# Patient Record
Sex: FEMALE | Race: WHITE | NOT HISPANIC OR LATINO | URBAN - METROPOLITAN AREA
[De-identification: names, ages, dates, MRNs, and addresses within clinical notes are randomized per-mention and may not be internally consistent; named-entity substitution may affect disease eponyms.]

---

## 2021-03-04 ENCOUNTER — IMMUNIZATION (OUTPATIENT)
Dept: VACCINE CLINIC | Facility: HOSPITAL | Age: 66
End: 2021-03-04

## 2021-03-04 PROCEDURE — 0001A: CPT | Performed by: INTERNAL MEDICINE

## 2021-03-04 PROCEDURE — 91300 HC SARSCOV02 VAC 30MCG/0.3ML IM: CPT | Performed by: INTERNAL MEDICINE

## 2021-03-25 ENCOUNTER — IMMUNIZATION (OUTPATIENT)
Dept: VACCINE CLINIC | Facility: HOSPITAL | Age: 66
End: 2021-03-25

## 2021-03-25 PROCEDURE — 0002A: CPT | Performed by: INTERNAL MEDICINE

## 2021-03-25 PROCEDURE — 91300 HC SARSCOV02 VAC 30MCG/0.3ML IM: CPT | Performed by: INTERNAL MEDICINE

## 2024-11-27 ENCOUNTER — HOSPITAL ENCOUNTER (INPATIENT)
Facility: HOSPITAL | Age: 69
LOS: 4 days | Discharge: HOME | End: 2024-12-03
Attending: INTERNAL MEDICINE | Admitting: INTERNAL MEDICINE
Payer: MEDICARE

## 2024-11-27 DIAGNOSIS — N88.8 MASS OF UTERINE CERVIX DETERMINED BY ULTRASOUND: Primary | ICD-10-CM

## 2024-11-27 DIAGNOSIS — N13.30 HYDRONEPHROSIS: ICD-10-CM

## 2024-11-27 PROBLEM — R06.00 DYSPNEA: Status: ACTIVE | Noted: 2024-11-27

## 2024-11-27 LAB
GEN 5 1HR TROPONIN T REFLEX: 6 NG/L
TROPONIN T DELTA: NORMAL
TROPONIN T SERPL HS-MCNC: <6 NG/L

## 2024-11-27 PROCEDURE — 25010000002 ONDANSETRON PER 1 MG: Performed by: INTERNAL MEDICINE

## 2024-11-27 PROCEDURE — G0378 HOSPITAL OBSERVATION PER HR: HCPCS

## 2024-11-27 PROCEDURE — 84484 ASSAY OF TROPONIN QUANT: CPT | Performed by: INTERNAL MEDICINE

## 2024-11-27 RX ORDER — BISACODYL 10 MG
10 SUPPOSITORY, RECTAL RECTAL DAILY PRN
Status: DISCONTINUED | OUTPATIENT
Start: 2024-11-27 | End: 2024-12-02

## 2024-11-27 RX ORDER — HYDROXYZINE PAMOATE 25 MG/1
25 CAPSULE ORAL 3 TIMES DAILY PRN
Status: DISCONTINUED | OUTPATIENT
Start: 2024-11-27 | End: 2024-12-03 | Stop reason: HOSPADM

## 2024-11-27 RX ORDER — TRAZODONE HYDROCHLORIDE 50 MG/1
50 TABLET, FILM COATED ORAL NIGHTLY
Status: DISCONTINUED | OUTPATIENT
Start: 2024-11-27 | End: 2024-12-03 | Stop reason: HOSPADM

## 2024-11-27 RX ORDER — TRAZODONE HYDROCHLORIDE 50 MG/1
50 TABLET, FILM COATED ORAL NIGHTLY
COMMUNITY

## 2024-11-27 RX ORDER — CARBAMAZEPINE 200 MG/1
400 TABLET ORAL 3 TIMES DAILY
Status: DISCONTINUED | OUTPATIENT
Start: 2024-11-27 | End: 2024-12-03 | Stop reason: HOSPADM

## 2024-11-27 RX ORDER — HYDROXYZINE PAMOATE 25 MG/1
25 CAPSULE ORAL 3 TIMES DAILY PRN
COMMUNITY

## 2024-11-27 RX ORDER — IBUPROFEN 600 MG/1
600 TABLET, FILM COATED ORAL EVERY 6 HOURS PRN
COMMUNITY
End: 2024-12-03 | Stop reason: HOSPADM

## 2024-11-27 RX ORDER — POLYETHYLENE GLYCOL 3350 17 G/17G
17 POWDER, FOR SOLUTION ORAL DAILY PRN
Status: DISCONTINUED | OUTPATIENT
Start: 2024-11-27 | End: 2024-12-02

## 2024-11-27 RX ORDER — ATORVASTATIN CALCIUM 80 MG/1
80 TABLET, FILM COATED ORAL NIGHTLY
COMMUNITY

## 2024-11-27 RX ORDER — PREGABALIN 50 MG/1
50 CAPSULE ORAL 2 TIMES DAILY
Status: DISCONTINUED | OUTPATIENT
Start: 2024-11-27 | End: 2024-12-03 | Stop reason: HOSPADM

## 2024-11-27 RX ORDER — ERGOCALCIFEROL 1.25 MG/1
50000 CAPSULE, LIQUID FILLED ORAL WEEKLY
COMMUNITY

## 2024-11-27 RX ORDER — ATORVASTATIN CALCIUM 80 MG/1
80 TABLET, FILM COATED ORAL NIGHTLY
Status: DISCONTINUED | OUTPATIENT
Start: 2024-11-27 | End: 2024-12-03 | Stop reason: HOSPADM

## 2024-11-27 RX ORDER — EZETIMIBE 10 MG/1
10 TABLET ORAL DAILY
COMMUNITY

## 2024-11-27 RX ORDER — ONDANSETRON 2 MG/ML
4 INJECTION INTRAMUSCULAR; INTRAVENOUS EVERY 6 HOURS PRN
Status: DISCONTINUED | OUTPATIENT
Start: 2024-11-27 | End: 2024-12-03 | Stop reason: HOSPADM

## 2024-11-27 RX ORDER — ACETAMINOPHEN 160 MG/5ML
650 SOLUTION ORAL EVERY 4 HOURS PRN
Status: DISCONTINUED | OUTPATIENT
Start: 2024-11-27 | End: 2024-12-03 | Stop reason: HOSPADM

## 2024-11-27 RX ORDER — BISACODYL 5 MG/1
5 TABLET, DELAYED RELEASE ORAL DAILY PRN
Status: DISCONTINUED | OUTPATIENT
Start: 2024-11-27 | End: 2024-12-02

## 2024-11-27 RX ORDER — ACETAMINOPHEN 500 MG
500 TABLET ORAL EVERY 4 HOURS PRN
COMMUNITY

## 2024-11-27 RX ORDER — CYCLOBENZAPRINE HCL 10 MG
10 TABLET ORAL 2 TIMES DAILY PRN
Status: DISCONTINUED | OUTPATIENT
Start: 2024-11-27 | End: 2024-12-03 | Stop reason: HOSPADM

## 2024-11-27 RX ORDER — AMOXICILLIN 250 MG
2 CAPSULE ORAL 2 TIMES DAILY PRN
Status: DISCONTINUED | OUTPATIENT
Start: 2024-11-27 | End: 2024-12-02

## 2024-11-27 RX ORDER — ACETAMINOPHEN 325 MG/1
650 TABLET ORAL EVERY 4 HOURS PRN
Status: DISCONTINUED | OUTPATIENT
Start: 2024-11-27 | End: 2024-12-03 | Stop reason: HOSPADM

## 2024-11-27 RX ORDER — LEVOTHYROXINE SODIUM 50 UG/1
50 TABLET ORAL DAILY
Status: DISCONTINUED | OUTPATIENT
Start: 2024-11-28 | End: 2024-12-03 | Stop reason: HOSPADM

## 2024-11-27 RX ORDER — ACETAMINOPHEN 650 MG/1
650 SUPPOSITORY RECTAL EVERY 4 HOURS PRN
Status: DISCONTINUED | OUTPATIENT
Start: 2024-11-27 | End: 2024-12-03 | Stop reason: HOSPADM

## 2024-11-27 RX ORDER — ONDANSETRON 4 MG/1
4 TABLET, ORALLY DISINTEGRATING ORAL EVERY 6 HOURS PRN
Status: DISCONTINUED | OUTPATIENT
Start: 2024-11-27 | End: 2024-12-03 | Stop reason: HOSPADM

## 2024-11-27 RX ORDER — CYCLOBENZAPRINE HCL 10 MG
10 TABLET ORAL 2 TIMES DAILY PRN
COMMUNITY

## 2024-11-27 RX ORDER — PREGABALIN 50 MG/1
50 CAPSULE ORAL 2 TIMES DAILY
COMMUNITY

## 2024-11-27 RX ORDER — CARBAMAZEPINE 200 MG/1
200 TABLET ORAL 3 TIMES DAILY
COMMUNITY
End: 2024-12-03 | Stop reason: HOSPADM

## 2024-11-27 RX ORDER — ONDANSETRON 4 MG/1
4 TABLET, FILM COATED ORAL EVERY 8 HOURS PRN
COMMUNITY

## 2024-11-27 RX ORDER — MELOXICAM 15 MG/1
15 TABLET ORAL DAILY
COMMUNITY
End: 2024-12-03 | Stop reason: HOSPADM

## 2024-11-27 RX ORDER — LEVOTHYROXINE SODIUM 50 UG/1
50 TABLET ORAL DAILY
COMMUNITY

## 2024-11-27 RX ORDER — CARBAMAZEPINE 200 MG/1
200 TABLET ORAL 3 TIMES DAILY
Status: DISCONTINUED | OUTPATIENT
Start: 2024-11-27 | End: 2024-11-27

## 2024-11-27 RX ADMIN — ONDANSETRON 4 MG: 2 INJECTION, SOLUTION INTRAMUSCULAR; INTRAVENOUS at 21:24

## 2024-11-28 ENCOUNTER — APPOINTMENT (OUTPATIENT)
Dept: CT IMAGING | Facility: HOSPITAL | Age: 69
End: 2024-11-28
Payer: MEDICARE

## 2024-11-28 ENCOUNTER — APPOINTMENT (OUTPATIENT)
Dept: CARDIOLOGY | Facility: HOSPITAL | Age: 69
End: 2024-11-28
Payer: MEDICARE

## 2024-11-28 ENCOUNTER — APPOINTMENT (OUTPATIENT)
Dept: GENERAL RADIOLOGY | Facility: HOSPITAL | Age: 69
End: 2024-11-28
Payer: MEDICARE

## 2024-11-28 PROBLEM — E78.5 HLD (HYPERLIPIDEMIA): Status: ACTIVE | Noted: 2024-11-28

## 2024-11-28 PROBLEM — G89.4 CHRONIC PAIN SYNDROME: Status: ACTIVE | Noted: 2024-11-28

## 2024-11-28 PROBLEM — M54.9 CHRONIC BACK PAIN: Status: ACTIVE | Noted: 2024-11-28

## 2024-11-28 PROBLEM — E87.1 HYPONATREMIA: Status: ACTIVE | Noted: 2024-11-28

## 2024-11-28 PROBLEM — N93.9 ABNORMAL VAGINAL BLEEDING: Status: ACTIVE | Noted: 2024-11-28

## 2024-11-28 PROBLEM — E55.9 VITAMIN D DEFICIENCY: Status: ACTIVE | Noted: 2024-11-28

## 2024-11-28 PROBLEM — E03.9 HYPOTHYROIDISM (ACQUIRED): Status: ACTIVE | Noted: 2024-11-28

## 2024-11-28 PROBLEM — E87.6 HYPOKALEMIA: Status: ACTIVE | Noted: 2024-11-28

## 2024-11-28 PROBLEM — N88.8: Status: ACTIVE | Noted: 2024-11-28

## 2024-11-28 PROBLEM — D64.9 ANEMIA: Status: ACTIVE | Noted: 2024-11-28

## 2024-11-28 PROBLEM — F41.8 ANXIETY ASSOCIATED WITH DEPRESSION: Status: ACTIVE | Noted: 2024-11-28

## 2024-11-28 PROBLEM — G89.29 CHRONIC BACK PAIN: Status: ACTIVE | Noted: 2024-11-28

## 2024-11-28 PROBLEM — G50.0 TRIGEMINAL NEURALGIA: Status: ACTIVE | Noted: 2024-11-28

## 2024-11-28 LAB
ANION GAP SERPL CALCULATED.3IONS-SCNC: 10.5 MMOL/L (ref 5–15)
AORTIC DIMENSIONLESS INDEX: 0.84 (DI)
ASCENDING AORTA: 2.9 CM
BH CV ECHO MEAS - AO MAX PG: 7.4 MMHG
BH CV ECHO MEAS - AO MEAN PG: 4.2 MMHG
BH CV ECHO MEAS - AO ROOT DIAM: 3.1 CM
BH CV ECHO MEAS - AO V2 MAX: 135.7 CM/SEC
BH CV ECHO MEAS - AO V2 VTI: 29.3 CM
BH CV ECHO MEAS - AVA(I,D): 2.13 CM2
BH CV ECHO MEAS - EDV(CUBED): 31.5 ML
BH CV ECHO MEAS - EDV(MOD-SP2): 126 ML
BH CV ECHO MEAS - EDV(MOD-SP4): 115 ML
BH CV ECHO MEAS - EF(MOD-BP): 68.5 %
BH CV ECHO MEAS - EF(MOD-SP2): 70.6 %
BH CV ECHO MEAS - EF(MOD-SP4): 69.6 %
BH CV ECHO MEAS - ESV(CUBED): 14.2 ML
BH CV ECHO MEAS - ESV(MOD-SP2): 37 ML
BH CV ECHO MEAS - ESV(MOD-SP4): 35 ML
BH CV ECHO MEAS - FS: 23.3 %
BH CV ECHO MEAS - IVS/LVPW: 1.19 CM
BH CV ECHO MEAS - IVSD: 1.25 CM
BH CV ECHO MEAS - LAT PEAK E' VEL: 9 CM/SEC
BH CV ECHO MEAS - LV MASS(C)D: 110 GRAMS
BH CV ECHO MEAS - LV MAX PG: 5.6 MMHG
BH CV ECHO MEAS - LV MEAN PG: 2.7 MMHG
BH CV ECHO MEAS - LV V1 MAX: 118.4 CM/SEC
BH CV ECHO MEAS - LV V1 VTI: 24.7 CM
BH CV ECHO MEAS - LVIDD: 3.2 CM
BH CV ECHO MEAS - LVIDS: 2.42 CM
BH CV ECHO MEAS - LVOT AREA: 2.5 CM2
BH CV ECHO MEAS - LVOT DIAM: 1.79 CM
BH CV ECHO MEAS - LVPWD: 1.05 CM
BH CV ECHO MEAS - MED PEAK E' VEL: 9.2 CM/SEC
BH CV ECHO MEAS - MV A DUR: 0.15 SEC
BH CV ECHO MEAS - MV A MAX VEL: 74.6 CM/SEC
BH CV ECHO MEAS - MV DEC SLOPE: 275.4 CM/SEC2
BH CV ECHO MEAS - MV DEC TIME: 0.12 SEC
BH CV ECHO MEAS - MV E MAX VEL: 68.4 CM/SEC
BH CV ECHO MEAS - MV E/A: 0.92
BH CV ECHO MEAS - MV MAX PG: 3.2 MMHG
BH CV ECHO MEAS - MV MEAN PG: 1.55 MMHG
BH CV ECHO MEAS - MV P1/2T: 95.3 MSEC
BH CV ECHO MEAS - MV V2 VTI: 25.8 CM
BH CV ECHO MEAS - MVA(P1/2T): 2.31 CM2
BH CV ECHO MEAS - MVA(VTI): 2.42 CM2
BH CV ECHO MEAS - PA ACC TIME: 0.11 SEC
BH CV ECHO MEAS - PA V2 MAX: 85.5 CM/SEC
BH CV ECHO MEAS - PULM A REVS DUR: 0.16 SEC
BH CV ECHO MEAS - PULM A REVS VEL: 24 CM/SEC
BH CV ECHO MEAS - PULM DIAS VEL: 27.4 CM/SEC
BH CV ECHO MEAS - PULM S/D: 0.7
BH CV ECHO MEAS - PULM SYS VEL: 19.3 CM/SEC
BH CV ECHO MEAS - RV MAX PG: 1.63 MMHG
BH CV ECHO MEAS - RV V1 MAX: 63.8 CM/SEC
BH CV ECHO MEAS - RV V1 VTI: 12 CM
BH CV ECHO MEAS - SV(LVOT): 62.4 ML
BH CV ECHO MEAS - SV(MOD-SP2): 89 ML
BH CV ECHO MEAS - SV(MOD-SP4): 80 ML
BH CV ECHO MEASUREMENTS AVERAGE E/E' RATIO: 7.52
BH CV XLRA - RV BASE: 3 CM
BH CV XLRA - TDI S': 11.1 CM/SEC
BUN SERPL-MCNC: 14 MG/DL (ref 8–23)
BUN/CREAT SERPL: 12.2 (ref 7–25)
CALCIUM SPEC-SCNC: 8.5 MG/DL (ref 8.6–10.5)
CHLORIDE SERPL-SCNC: 91 MMOL/L (ref 98–107)
CO2 SERPL-SCNC: 25.5 MMOL/L (ref 22–29)
CREAT SERPL-MCNC: 1.15 MG/DL (ref 0.57–1)
DAT POLY-SP REAG RBC QL: NEGATIVE
DEPRECATED RDW RBC AUTO: 39.2 FL (ref 37–54)
EGFRCR SERPLBLD CKD-EPI 2021: 51.4 ML/MIN/1.73
ERYTHROCYTE [DISTWIDTH] IN BLOOD BY AUTOMATED COUNT: 12.2 % (ref 12.3–15.4)
FERRITIN SERPL-MCNC: 679 NG/ML (ref 13–150)
FOLATE SERPL-MCNC: 14.7 NG/ML (ref 4.78–24.2)
GLUCOSE SERPL-MCNC: 118 MG/DL (ref 65–99)
HAPTOGLOB SERPL-MCNC: 420 MG/DL (ref 30–200)
HCT VFR BLD AUTO: 27.4 % (ref 34–46.6)
HGB BLD-MCNC: 9.6 G/DL (ref 12–15.9)
HGB RETIC QN AUTO: 30.8 PG (ref 29.8–36.1)
IMM RETICS NFR: 10.3 % (ref 3–15.8)
IRON 24H UR-MRATE: 13 MCG/DL (ref 37–145)
IRON SATN MFR SERPL: 6 % (ref 20–50)
LDH SERPL-CCNC: 204 U/L (ref 135–214)
LEFT ATRIUM VOLUME INDEX: 26.3 ML/M2
MAGNESIUM SERPL-MCNC: 2.2 MG/DL (ref 1.6–2.4)
MCH RBC QN AUTO: 30.9 PG (ref 26.6–33)
MCHC RBC AUTO-ENTMCNC: 35 G/DL (ref 31.5–35.7)
MCV RBC AUTO: 88.1 FL (ref 79–97)
OSMOLALITY SERPL: 273 MOSM/KG (ref 280–301)
OSMOLALITY UR: 474 MOSM/KG
PLATELET # BLD AUTO: 274 10*3/MM3 (ref 140–450)
PMV BLD AUTO: 9.4 FL (ref 6–12)
POTASSIUM SERPL-SCNC: 3.2 MMOL/L (ref 3.5–5.2)
POTASSIUM SERPL-SCNC: 3.5 MMOL/L (ref 3.5–5.2)
QT INTERVAL: 401 MS
QTC INTERVAL: 475 MS
RBC # BLD AUTO: 3.11 10*6/MM3 (ref 3.77–5.28)
RETICS # AUTO: 0.04 10*6/MM3 (ref 0.02–0.13)
RETICS # AUTO: 0.04 10*6/MM3 (ref 0.02–0.13)
RETICS/RBC NFR AUTO: 1.43 % (ref 0.7–1.9)
RETICS/RBC NFR AUTO: 1.43 % (ref 0.7–1.9)
SINUS: 3.3 CM
SODIUM SERPL-SCNC: 127 MMOL/L (ref 136–145)
SODIUM UR-SCNC: <20 MMOL/L
STJ: 2.49 CM
TIBC SERPL-MCNC: 203 MCG/DL (ref 298–536)
TRANSFERRIN SERPL-MCNC: 136 MG/DL (ref 200–360)
TSH SERPL DL<=0.05 MIU/L-ACNC: 0.51 UIU/ML (ref 0.27–4.2)
VIT B12 BLD-MCNC: 505 PG/ML (ref 211–946)
WBC NRBC COR # BLD AUTO: 11.81 10*3/MM3 (ref 3.4–10.8)

## 2024-11-28 PROCEDURE — 83930 ASSAY OF BLOOD OSMOLALITY: CPT | Performed by: HOSPITALIST

## 2024-11-28 PROCEDURE — 93306 TTE W/DOPPLER COMPLETE: CPT | Performed by: INTERNAL MEDICINE

## 2024-11-28 PROCEDURE — G0378 HOSPITAL OBSERVATION PER HR: HCPCS

## 2024-11-28 PROCEDURE — 93005 ELECTROCARDIOGRAM TRACING: CPT | Performed by: STUDENT IN AN ORGANIZED HEALTH CARE EDUCATION/TRAINING PROGRAM

## 2024-11-28 PROCEDURE — 85046 RETICYTE/HGB CONCENTRATE: CPT | Performed by: STUDENT IN AN ORGANIZED HEALTH CARE EDUCATION/TRAINING PROGRAM

## 2024-11-28 PROCEDURE — 80048 BASIC METABOLIC PNL TOTAL CA: CPT | Performed by: INTERNAL MEDICINE

## 2024-11-28 PROCEDURE — 25510000001 IOPAMIDOL PER 1 ML: Performed by: HOSPITALIST

## 2024-11-28 PROCEDURE — 25810000003 SODIUM CHLORIDE 0.9 % SOLUTION: Performed by: HOSPITALIST

## 2024-11-28 PROCEDURE — 25010000002 POTASSIUM CHLORIDE 10 MEQ/100ML SOLUTION: Performed by: HOSPITALIST

## 2024-11-28 PROCEDURE — 85045 AUTOMATED RETICULOCYTE COUNT: CPT | Performed by: STUDENT IN AN ORGANIZED HEALTH CARE EDUCATION/TRAINING PROGRAM

## 2024-11-28 PROCEDURE — 82607 VITAMIN B-12: CPT | Performed by: STUDENT IN AN ORGANIZED HEALTH CARE EDUCATION/TRAINING PROGRAM

## 2024-11-28 PROCEDURE — 25010000002 ONDANSETRON PER 1 MG: Performed by: INTERNAL MEDICINE

## 2024-11-28 PROCEDURE — 71045 X-RAY EXAM CHEST 1 VIEW: CPT

## 2024-11-28 PROCEDURE — 25510000001 PERFLUTREN 6.52 MG/ML SUSPENSION 2 ML VIAL: Performed by: INTERNAL MEDICINE

## 2024-11-28 PROCEDURE — 99222 1ST HOSP IP/OBS MODERATE 55: CPT | Performed by: STUDENT IN AN ORGANIZED HEALTH CARE EDUCATION/TRAINING PROGRAM

## 2024-11-28 PROCEDURE — 99203 OFFICE O/P NEW LOW 30 MIN: CPT | Performed by: STUDENT IN AN ORGANIZED HEALTH CARE EDUCATION/TRAINING PROGRAM

## 2024-11-28 PROCEDURE — 83615 LACTATE (LD) (LDH) ENZYME: CPT | Performed by: STUDENT IN AN ORGANIZED HEALTH CARE EDUCATION/TRAINING PROGRAM

## 2024-11-28 PROCEDURE — 93010 ELECTROCARDIOGRAM REPORT: CPT | Performed by: INTERNAL MEDICINE

## 2024-11-28 PROCEDURE — 84300 ASSAY OF URINE SODIUM: CPT | Performed by: HOSPITALIST

## 2024-11-28 PROCEDURE — 25010000002 PROCHLORPERAZINE 10 MG/2ML SOLUTION: Performed by: HOSPITALIST

## 2024-11-28 PROCEDURE — 71275 CT ANGIOGRAPHY CHEST: CPT

## 2024-11-28 PROCEDURE — 83540 ASSAY OF IRON: CPT | Performed by: STUDENT IN AN ORGANIZED HEALTH CARE EDUCATION/TRAINING PROGRAM

## 2024-11-28 PROCEDURE — 83735 ASSAY OF MAGNESIUM: CPT | Performed by: HOSPITALIST

## 2024-11-28 PROCEDURE — 74177 CT ABD & PELVIS W/CONTRAST: CPT

## 2024-11-28 PROCEDURE — 82728 ASSAY OF FERRITIN: CPT | Performed by: STUDENT IN AN ORGANIZED HEALTH CARE EDUCATION/TRAINING PROGRAM

## 2024-11-28 PROCEDURE — 83935 ASSAY OF URINE OSMOLALITY: CPT | Performed by: HOSPITALIST

## 2024-11-28 PROCEDURE — 85027 COMPLETE CBC AUTOMATED: CPT | Performed by: INTERNAL MEDICINE

## 2024-11-28 PROCEDURE — 84443 ASSAY THYROID STIM HORMONE: CPT | Performed by: HOSPITALIST

## 2024-11-28 PROCEDURE — 86880 COOMBS TEST DIRECT: CPT | Performed by: STUDENT IN AN ORGANIZED HEALTH CARE EDUCATION/TRAINING PROGRAM

## 2024-11-28 PROCEDURE — 84466 ASSAY OF TRANSFERRIN: CPT | Performed by: STUDENT IN AN ORGANIZED HEALTH CARE EDUCATION/TRAINING PROGRAM

## 2024-11-28 PROCEDURE — 93306 TTE W/DOPPLER COMPLETE: CPT

## 2024-11-28 PROCEDURE — 84132 ASSAY OF SERUM POTASSIUM: CPT | Performed by: HOSPITALIST

## 2024-11-28 PROCEDURE — 83010 ASSAY OF HAPTOGLOBIN QUANT: CPT | Performed by: STUDENT IN AN ORGANIZED HEALTH CARE EDUCATION/TRAINING PROGRAM

## 2024-11-28 PROCEDURE — 82746 ASSAY OF FOLIC ACID SERUM: CPT | Performed by: STUDENT IN AN ORGANIZED HEALTH CARE EDUCATION/TRAINING PROGRAM

## 2024-11-28 RX ORDER — IOPAMIDOL 755 MG/ML
100 INJECTION, SOLUTION INTRAVASCULAR
Status: COMPLETED | OUTPATIENT
Start: 2024-11-28 | End: 2024-11-28

## 2024-11-28 RX ORDER — SODIUM CHLORIDE 9 MG/ML
100 INJECTION, SOLUTION INTRAVENOUS CONTINUOUS
Status: ACTIVE | OUTPATIENT
Start: 2024-11-28 | End: 2024-11-28

## 2024-11-28 RX ORDER — POTASSIUM CHLORIDE 750 MG/1
40 TABLET, FILM COATED, EXTENDED RELEASE ORAL EVERY 4 HOURS
Status: DISCONTINUED | OUTPATIENT
Start: 2024-11-28 | End: 2024-11-28

## 2024-11-28 RX ORDER — PROCHLORPERAZINE EDISYLATE 5 MG/ML
10 INJECTION INTRAMUSCULAR; INTRAVENOUS EVERY 6 HOURS PRN
Status: DISCONTINUED | OUTPATIENT
Start: 2024-11-28 | End: 2024-12-03 | Stop reason: HOSPADM

## 2024-11-28 RX ORDER — POTASSIUM CHLORIDE 750 MG/1
40 TABLET, FILM COATED, EXTENDED RELEASE ORAL EVERY 4 HOURS
Status: COMPLETED | OUTPATIENT
Start: 2024-11-28 | End: 2024-11-29

## 2024-11-28 RX ORDER — POTASSIUM CHLORIDE 7.45 MG/ML
10 INJECTION INTRAVENOUS
Status: COMPLETED | OUTPATIENT
Start: 2024-11-28 | End: 2024-11-28

## 2024-11-28 RX ADMIN — CARBAMAZEPINE 400 MG: 200 TABLET ORAL at 12:25

## 2024-11-28 RX ADMIN — POTASSIUM CHLORIDE 10 MEQ: 7.46 INJECTION, SOLUTION INTRAVENOUS at 15:32

## 2024-11-28 RX ADMIN — LEVOTHYROXINE SODIUM 50 MCG: 0.05 TABLET ORAL at 12:25

## 2024-11-28 RX ADMIN — POTASSIUM CHLORIDE 40 MEQ: 750 TABLET, EXTENDED RELEASE ORAL at 21:05

## 2024-11-28 RX ADMIN — IOPAMIDOL 100 ML: 755 INJECTION, SOLUTION INTRAVENOUS at 14:03

## 2024-11-28 RX ADMIN — ACETAMINOPHEN 650 MG: 325 TABLET ORAL at 11:17

## 2024-11-28 RX ADMIN — PREGABALIN 50 MG: 50 CAPSULE ORAL at 12:25

## 2024-11-28 RX ADMIN — POTASSIUM CHLORIDE 10 MEQ: 7.46 INJECTION, SOLUTION INTRAVENOUS at 11:43

## 2024-11-28 RX ADMIN — CARBAMAZEPINE 400 MG: 200 TABLET ORAL at 21:17

## 2024-11-28 RX ADMIN — POTASSIUM CHLORIDE 10 MEQ: 7.46 INJECTION, SOLUTION INTRAVENOUS at 14:39

## 2024-11-28 RX ADMIN — PERFLUTREN 2 ML: 6.52 INJECTION, SUSPENSION INTRAVENOUS at 13:55

## 2024-11-28 RX ADMIN — PREGABALIN 50 MG: 50 CAPSULE ORAL at 21:05

## 2024-11-28 RX ADMIN — SODIUM CHLORIDE 100 ML/HR: 9 INJECTION, SOLUTION INTRAVENOUS at 08:16

## 2024-11-28 RX ADMIN — TRAZODONE HYDROCHLORIDE 50 MG: 50 TABLET ORAL at 21:05

## 2024-11-28 RX ADMIN — ONDANSETRON 4 MG: 2 INJECTION, SOLUTION INTRAMUSCULAR; INTRAVENOUS at 08:16

## 2024-11-28 RX ADMIN — ACETAMINOPHEN 650 MG: 325 TABLET ORAL at 16:43

## 2024-11-28 RX ADMIN — POTASSIUM CHLORIDE 10 MEQ: 7.46 INJECTION, SOLUTION INTRAVENOUS at 10:06

## 2024-11-28 RX ADMIN — ATORVASTATIN CALCIUM 80 MG: 80 TABLET, FILM COATED ORAL at 21:05

## 2024-11-28 RX ADMIN — PROCHLORPERAZINE EDISYLATE 10 MG: 5 INJECTION INTRAMUSCULAR; INTRAVENOUS at 11:43

## 2024-11-28 NOTE — NURSING NOTE
Patient refused medications r/t to [facility]. Medication was open. Unable to return to [facility]. Spoke with Sonia Jaramillo about how to properly dispose of lyrica. Per her recommendations lyrica was disposed in the biohazard black box and was witnessed by Courtney Goldsmith RN

## 2024-11-28 NOTE — H&P
HISTORY AND PHYSICAL   [facility]        Date of Admission: 2024  Patient Identification:  Name: Ambreen Espinoza  Age: 69 y.o.  Sex: female  :  1955  MRN: [id number]                     Primary Care Physician: Lisseth Rankin APRN    Chief Complaint:  69 year old female presented to the ED at [facility] with shortness of breath; this started about a month ago and is worse with exertion and relieved with rest; she denies chest pain; no fever or chills; she has no known history of lung or heart disease; she used to smoke but stopped some years ago; no sick contacts; she has also had vaginal bleeding and had an endometrial biopsy earlier today    History of Present Illness:   As above    Past Medical History:  Hyperlipidemia  Hypothyroidism  Trigeminal neuralgia    Past Surgical History:  History reviewed. No pertinent surgical history.   Home Meds:  Medications Prior to Admission   Medication Sig Dispense Refill Last Dose/Taking    acetaminophen (TYLENOL) 500 MG tablet Take 1 tablet by mouth Every 4 (Four) Hours As Needed for Mild Pain.   2024    atorvastatin (LIPITOR) 80 MG tablet Take 1 tablet by mouth Every Night.   2024    carBAMazepine (TEGretol) 200 MG tablet Take 1 tablet by mouth 3 (Three) Times a Day. Pt takes 2 tablets (400mg) TID   2024    ezetimibe (ZETIA) 10 MG tablet Take 1 tablet by mouth Daily. Pt takes 2 tablets (20mg) once daily   2024    hydrOXYzine pamoate (VISTARIL) 25 MG capsule Take 1 capsule by mouth 3 (Three) Times a Day As Needed for Itching. 1-2 capsule TID PRN   Taking As Needed    ibuprofen (ADVIL,MOTRIN) 600 MG tablet Take 1 tablet by mouth Every 6 (Six) Hours As Needed for Mild Pain.   2024    levothyroxine (SYNTHROID, LEVOTHROID) 50 MCG tablet Take 1 tablet by mouth Daily.   2024    meloxicam (MOBIC) 15 MG tablet Take 1 tablet by mouth Daily.   2024    ondansetron (ZOFRAN) 4 MG tablet Take 1 tablet by mouth  Every 8 (Eight) Hours As Needed for Nausea or Vomiting.   11/27/2024    pregabalin (LYRICA) 50 MG capsule Take 1 capsule by mouth 2 (Two) Times a Day.   11/26/2024    traZODone (DESYREL) 50 MG tablet Take 1 tablet by mouth Every Night.   11/26/2024    vitamin D (ERGOCALCIFEROL) 1.25 MG (16058 UT) capsule capsule Take 1 capsule by mouth 1 (One) Time Per Week. Sunday   Past Week    cyclobenzaprine (FLEXERIL) 10 MG tablet Take 1 tablet by mouth 2 (Two) Times a Day As Needed for Muscle Spasms.          Allergies:  No Known Allergies  Immunizations:  Immunization History   Administered Date(s) Administered    COVID-19 (PFIZER) 12YRS+ (COMIRNATY) 11/16/2023    COVID-19 (PFIZER) BIVALENT 12+YRS 11/28/2022    COVID-19 (PFIZER) Purple Cap Monovalent 03/04/2021, 03/25/2021, 09/29/2021     Social History:   Social History     Social History Narrative    Not on file     Social History     Socioeconomic History    Marital status:    Tobacco Use    Smoking status: Never    Smokeless tobacco: Never   Vaping Use    Vaping status: Never Used   Substance and Sexual Activity    Alcohol use: Never    Drug use: Never    Sexual activity: Defer       Family History:  History reviewed. No pertinent family history.     Review of Systems  See history of present illness and past medical history.  Patient denies headache, dizziness, syncope, falls, trauma, change in vision, change in hearing, change in taste, changes in weight, changes in appetite, focal weakness, numbness, or paresthesia.  Patient denies chest pain, palpitations  PND, cough, sinus pressure, rhinorrhea, epistaxis, hemoptysis, nausea, vomiting,hematemesis, diarrhea, constipation or hematochezia.  Denies cold or heat intolerance, polydipsia, polyuria, polyphagia. Denies hematuria, pyuria, dysuria, hesitancy, frequency or urgency. Denies consumption of raw and under cooked meats foods or change in water source.  Denies fever, chills, sweats, night sweats.  Denies missing  "any routine medications. Remainder of ROS is negative.    Objective:  T Max 24 hrs: Temp (24hrs), Av.8 °F (36.6 °C), Min:97.8 °F (36.6 °C), Max:97.8 °F (36.6 °C)    Vitals Ranges:   Temp:  [97.8 °F (36.6 °C)] 97.8 °F (36.6 °C)  Heart Rate:  [96] 96  Resp:  [15] 15  BP: (108)/(70) 108/70      Exam:  There were no vitals taken for this visit.    General Appearance:    Alert, cooperative, no distress, appears stated age   Head:    Normocephalic, without obvious abnormality, atraumatic   Eyes:    PERRL, conjunctivae/corneas clear, EOM's intact, both eyes   Ears:    Normal external ear canals, both ears   Nose:   Nares normal, septum midline, mucosa normal, no drainage    or sinus tenderness   Throat:   Lips, mucosa, and tongue normal   Neck:   Supple, symmetrical, trachea midline, no adenopathy;     thyroid:  no enlargement/tenderness/nodules; no carotid    bruit or JVD   Back:     Symmetric, no curvature, ROM normal, no CVA tenderness   Lungs:     Decreased breath sounds bilaterally, respirations unlabored   Chest Wall:    No tenderness or deformity    Heart:    Regular rate and rhythm, S1 and S2 normal, no murmur, rub   or gallop   Abdomen:     Soft, nontender, bowel sounds active all four quadrants,     no masses, no hepatomegaly, no splenomegaly   Extremities:   Extremities normal, atraumatic, no cyanosis or edema      .    Data Review:  Labs in chart were reviewed.  No results found for: \"WBC\", \"HGB\", \"HCT\", \"PLT\"  No results found for: \"NA\", \"K\", \"CL\", \"CO2\", \"BUN\", \"CREATININE\", \"GLUCOSE\"  No results found for: \"CALCIUM\", \"MG\", \"PHOS\"  No results found for: \"AST\", \"ALT\", \"ALKPHOS\"             Imaging Results (All)       None              Assessment:  Active Hospital Problems    Diagnosis  POA    **Dyspnea [R06.00]  Yes      Resolved Hospital Problems   No resolved problems to display.   Hyperlipidemia  Hypertension  Hypothyroidism  Hypokalemia  Vaginal bleeding  Hyperglycemia  Ckd3  anemia    Plan:  Will ask " cardiology to see her  Cta was supposedly done at [facility] but had not resulted  Echo, troponin  Monitor on telemetry  Hold off on fluids  Replace potassium  Dw patient and ed provider    Марина Paredes MD  11/27/2024  20:26 EST

## 2024-11-28 NOTE — PLAN OF CARE
Goal Outcome Evaluation:            IVF. Potassium replaced. ECHO and CT chest & abd/pelvis completed. NPO at midnight for potential surgery in the morning per Urology

## 2024-11-28 NOTE — PLAN OF CARE
Goal Outcome Evaluation:  Plan of Care Reviewed With: patient           Outcome Evaluation: A&Ox4. NSR. RA. 1 episode of emesis, zofran given x1. Pt up to bathroom with x1 assist.

## 2024-11-28 NOTE — CONSULTS
"[facility] INITIAL INPATIENT CONSULTATION NOTE    REASON FOR CONSULTATION:    Pelvic mass, symptomatic anemia    HISTORY OF PRESENT ILLNESS:  Ambreen Espinoza is a 69 y.o. female who we are asked to see today in consultation for anemia.  She presented to outside hospital ER yesterday with shortness of breath.    .  Patient reports she has been having nausea vomiting fatigue shortness of breath for the last month.  She also reports she has been having abnormal vaginal bleeding for a month and a half.she has lost about 10 pounds in the last 1 month.  She also reports taste change-salty food tastes \"disgusting \"to her now earlier yesterday she was seen at gynecologist office at [facility] for evaluation of postmenopausal bleeding ongoing for 2 weeks.  Pelvic ultrasound revealed a area of increased vascularity in her cervical region measuring about 5.3 cm.  Endometrial biopsy was obtained.  Results currently pending.  She is reporting that she has had increased bleeding since her biopsy yesterday.  Labs today notable for hemoglobin 9.6.  Patient denies any personal history of cancer      History reviewed. No pertinent past medical history.    History reviewed. No pertinent surgical history.    SOCIAL HISTORY:   reports that she has never smoked. She has never used smokeless tobacco. She reports that she does not drink alcohol and does not use drugs.    FAMILY HISTORY:  family history is not on file.    ALLERGIES:  No Known Allergies    MEDICATIONS:  As listed in the electronic medical record.    Review of Systems   Constitutional:  Positive for appetite change, fatigue and unexpected weight change.   Respiratory:  Positive for shortness of breath.    Gastrointestinal:  Negative for abdominal pain, anal bleeding and blood in stool.   Genitourinary:  Positive for vaginal bleeding. Negative for hematuria.   Hematological:  Negative for adenopathy. Does not bruise/bleed easily.       Vitals:    11/27/24 1930 " "11/27/24 2356 11/28/24 0357 11/28/24 0755   BP: 105/67 95/57 94/74 93/71   BP Location:  Right arm Right arm Right arm   Patient Position: Sitting Lying Lying Sitting   Pulse: 102 90 98 94   Resp: 18 16 16 16   Temp: 99.9 °F (37.7 °C) 99.9 °F (37.7 °C) 98.1 °F (36.7 °C) 99.3 °F (37.4 °C)   TempSrc: Oral Oral Oral Oral   SpO2:  98% 97% 100%   Weight: 71.6 kg (157 lb 14.4 oz)      Height: 165.1 cm (65\")          Physical Exam  Constitutional:       General: She is not in acute distress.     Appearance: Normal appearance.   HENT:      Head: Normocephalic and atraumatic.   Cardiovascular:      Rate and Rhythm: Normal rate and regular rhythm.      Heart sounds: No murmur heard.  Pulmonary:      Effort: No respiratory distress.      Breath sounds: Normal breath sounds.   Abdominal:      General: Abdomen is flat.      Palpations: Abdomen is soft. There is no mass.   Musculoskeletal:         General: No swelling.   Skin:     General: Skin is warm and dry.   Neurological:      General: No focal deficit present.      Mental Status: She is oriented to person, place, and time.         DIAGNOSTIC DATA:    Results from last 7 days   Lab Units 11/28/24  0354   WBC 10*3/mm3 11.81*   HEMOGLOBIN g/dL 9.6*   HEMATOCRIT % 27.4*   PLATELETS 10*3/mm3 274      Results from last 7 days   Lab Units 11/28/24  0354   SODIUM mmol/L 127*   POTASSIUM mmol/L 3.2*   CHLORIDE mmol/L 91*   CO2 mmol/L 25.5   BUN mg/dL 14   CREATININE mg/dL 1.15*   CALCIUM mg/dL 8.5*   GLUCOSE mg/dL 118*          IMAGING:    US Pelvic, Non OB, Transvaginal Only (11/27/2024 13:20)     Assessment & Plan   ASSESSMENT:  This is a 69 y.o. female with:    *Cervical mass status post endometrial biopsy on 11/27/2024 at [place], results pending  *Postmenopausal bleeding, ongoing for a month and a half  *Nausea, vomiting, ongoing for last 1 month  *Loss of appetite for the last 1 month  *Unintentional weight loss, 10 pounds in last 1 month  11/27/2024 pelvic ultrasound-abnormal " tissue at cervical area with increased vascularity-5.3 x 3.4 x 3.4 cm.  She is status post endometrial biopsy with Tiff Fontaine at [facility] yesterday, 11/27/2024.  Results currently pending   We will obtain CT chest abdomen pelvis for evaluation.  She will ultimately need referral to gynecology oncology at [facility] for further evaluation and management of the likely cervical malignancy.  I discussed that her nausea vomiting loss of appetite unintentional weight loss are likely secondary to underlying malignancy      *Normocytic anemia, likely from vaginal bleeding  11/28/2024 hemoglobin 9.6, MCV 88.1  Will do lab eval for anemia      RECOMMENDATIONS/PLAN:   Obtain ferritin, iron panel, reticulocyte hemoglobin, B12, folate, PHILIP, LDH, haptoglobin, reticulocyte count  Obtain CT chest abdomen pelvis  She will need to follow-up with [facility] gynecology oncology for evaluation and management of the pelvic mass/cervical mass  CBC with differential daily while hospitalized.  We will follow along    Daphne Yi MD

## 2024-11-28 NOTE — CONSULTS
Patient Name: Ambreen Espinoza  :1955  69 y.o.    Date of Admission: 2024  Date of Consultation:  24  Encounter Provider: Shari Pedersen RN  Place of Service: [facility]  Referring Provider: No Known Provider  Patient Care Team:  Lisseth Rankin APRN as PCP - General (Emergency Medicine)      Chief complaint: Shortness of breath    History of Present Illness: Ambreen Espinoza is a 69-year-old patient with a history of former smoker, hyperlipidemia, hypothyroidism and trigeminal neuralgia.     Patient presented to [facility] with shortness of breath that started about a month ago and is worse with exertion and relieved by rest . She denied chest pain, fever or chills.  Patient has a history of vaginal bleeding and had an endometrial biopsy earlier today.  Troponin T <6, 6.  CTA of the chest was done at [facility] but has not resulted.    Echo ordered.  I have been asked to see for dyspnea on exertion. HGB 9.6      History reviewed. No pertinent past medical history.    History reviewed. No pertinent surgical history.      Prior to Admission medications    Medication Sig Start Date End Date Taking? Authorizing Provider   acetaminophen (TYLENOL) 500 MG tablet Take 1 tablet by mouth Every 4 (Four) Hours As Needed for Mild Pain.   Yes Carlos Russo MD   atorvastatin (LIPITOR) 80 MG tablet Take 1 tablet by mouth Every Night.   Yes Carlos Russo MD   carBAMazepine (TEGretol) 200 MG tablet Take 1 tablet by mouth 3 (Three) Times a Day. Pt takes 2 tablets (400mg) TID   Yes Carlos Russo MD   ezetimibe (ZETIA) 10 MG tablet Take 1 tablet by mouth Daily. Pt takes 2 tablets (20mg) once daily   Yes Carlos Russo MD   hydrOXYzine pamoate (VISTARIL) 25 MG capsule Take 1 capsule by mouth 3 (Three) Times a Day As Needed for Itching. 1-2 capsule TID PRN   Yes Carlos Russo MD   ibuprofen (ADVIL,MOTRIN) 600 MG tablet  "Take 1 tablet by mouth Every 6 (Six) Hours As Needed for Mild Pain.   Yes Carlos Russo MD   levothyroxine (SYNTHROID, LEVOTHROID) 50 MCG tablet Take 1 tablet by mouth Daily.   Yes Carlos Russo MD   meloxicam (MOBIC) 15 MG tablet Take 1 tablet by mouth Daily.   Yes Carlos Russo MD   ondansetron (ZOFRAN) 4 MG tablet Take 1 tablet by mouth Every 8 (Eight) Hours As Needed for Nausea or Vomiting.   Yes Carlos Russo MD   pregabalin (LYRICA) 50 MG capsule Take 1 capsule by mouth 2 (Two) Times a Day.   Yes Carlos Russo MD   traZODone (DESYREL) 50 MG tablet Take 1 tablet by mouth Every Night.   Yes Carlos Russo MD   vitamin D (ERGOCALCIFEROL) 1.25 MG (55941 UT) capsule capsule Take 1 capsule by mouth 1 (One) Time Per Week. Sunday   Yes Carlos Russo MD   cyclobenzaprine (FLEXERIL) 10 MG tablet Take 1 tablet by mouth 2 (Two) Times a Day As Needed for Muscle Spasms.    Carlos Russo MD       No Known Allergies    Social History     Socioeconomic History    Marital status:    Tobacco Use    Smoking status: Never    Smokeless tobacco: Never   Vaping Use    Vaping status: Never Used   Substance and Sexual Activity    Alcohol use: Never    Drug use: Never    Sexual activity: Defer       History reviewed. No pertinent family history.    REVIEW OF SYSTEMS:   All systems reviewed.  Pertinent positives identified in HPI.  All other systems are negative.      Objective:     Vitals:    11/27/24 1930 11/27/24 2356 11/28/24 0357   BP: 105/67 95/57 94/74   BP Location:  Right arm Right arm   Patient Position: Sitting Lying Lying   Pulse: 102 90 98   Resp: 18 16 16   Temp: 99.9 °F (37.7 °C) 99.9 °F (37.7 °C) 98.1 °F (36.7 °C)   TempSrc: Oral Oral Oral   SpO2:  98% 97%   Weight: 71.6 kg (157 lb 14.4 oz)     Height: 165.1 cm (65\")       Body mass index is 26.28 kg/m².    General Appearance:    Alert, cooperative, in no acute distress   Head:    Normocephalic, without " obvious abnormality, atraumatic   Eyes:            Lids and lashes normal, conjunctivae and sclerae normal, no icterus, no pallor, corneas clear, PERRLA   Ears:    Ears appear intact with no abnormalities noted   Throat:   No oral lesions, no thrush, oral mucosa moist   Neck:   No adenopathy, supple, trachea midline, no thyromegaly, no carotid bruit, no JVD   Back:     No kyphosis present, no scoliosis present, no skin lesions, erythema or scars, no tenderness to percussion or palpation, range of motion normal   Lungs:     Clear to auscultation, respirations regular, even and unlabored    Heart:    Regular rhythm and normal rate, normal S1 and S2, no murmur, no gallop, no rub, no click   Chest Wall:    No abnormalities observed   Abdomen:     Normal bowel sounds, no masses, no organomegaly, soft, nontender, nondistended, no guarding, no rebound  tenderness   Extremities:   Moves all extremities well, no edema, no cyanosis, no redness   Pulses:   Pulses palpable and equal bilaterally. Normal radial, carotid, femoral, dorsalis pedis and posterior tibial pulses bilaterally. Normal abdominal aorta   Skin:  Psychiatric:   No bleeding, bruising or rash    Alert and oriented x 3, normal mood and affect   Lab Review:     Results from last 7 days   Lab Units 11/28/24  0354   SODIUM mmol/L 127*   POTASSIUM mmol/L 3.2*   CHLORIDE mmol/L 91*   CO2 mmol/L 25.5   BUN mg/dL 14   CREATININE mg/dL 1.15*   CALCIUM mg/dL 8.5*   GLUCOSE mg/dL 118*     Results from last 7 days   Lab Units 11/27/24  2247 11/27/24  2047   HSTROP T ng/L 6 <6     Results from last 7 days   Lab Units 11/28/24  0354   WBC 10*3/mm3 11.81*   HEMOGLOBIN g/dL 9.6*   HEMATOCRIT % 27.4*   PLATELETS 10*3/mm3 274                             I personally viewed and interpreted the patient's EKG/Telemetry data.        Assessment and Plan:       # Shortness of breath.  - Agree with echo, follow results  -Troponin negative, low suspicion for acute coronary process.  -  Chest x-ray, EKG  - Follow CTA results from [facility].  If Results remain unavailable, consider VQ scan    Fransisco Fox MD

## 2024-11-28 NOTE — PROGRESS NOTES
Name: Ambreen Espinoza ADMIT: 2024   : 1955  PCP: Lisseth Rankin APRN    MRN: [id number] LOS: 0 days   AGE/SEX: 69 y.o. female  ROOM: [facility]     Subjective   Subjective   Pt feeling a little better this afternoon. Nausea improved after Compazine. She is voiding okay. No flank pain or abd pain. No F/C/NS. No SOA at rest. No CP or palp.        Objective   Objective   Vital Signs  Temp:  [97.8 °F (36.6 °C)-99.9 °F (37.7 °C)] 99.3 °F (37.4 °C)  Heart Rate:  [] 74  Resp:  [15-18] 16  BP: ()/(57-74) 91/59  SpO2:  [97 %-100 %] 100 %  on   ;   Device (Oxygen Therapy): room air  Body mass index is 26.08 kg/m².  Physical Exam  Vitals and nursing note reviewed.   Constitutional:       General: She is not in acute distress.     Appearance: She is ill-appearing (chronically). She is not toxic-appearing or diaphoretic.   HENT:      Head: Normocephalic.      Nose: Nose normal.      Mouth/Throat:      Mouth: Mucous membranes are moist.      Pharynx: Oropharynx is clear.   Eyes:      General: No scleral icterus.        Right eye: No discharge.         Left eye: No discharge.      Conjunctiva/sclera: Conjunctivae normal.   Cardiovascular:      Rate and Rhythm: Normal rate and regular rhythm.      Pulses: Normal pulses.   Pulmonary:      Effort: Pulmonary effort is normal. No respiratory distress.      Breath sounds: Normal breath sounds. No wheezing or rales.   Abdominal:      General: Bowel sounds are normal. There is no distension.      Palpations: Abdomen is soft.      Tenderness: There is no abdominal tenderness.   Musculoskeletal:         General: No swelling.      Cervical back: Neck supple.   Skin:     General: Skin is warm and dry.      Capillary Refill: Capillary refill takes less than 2 seconds.      Coloration: Skin is pale. Skin is not jaundiced.   Neurological:      General: No focal deficit present.      Mental Status: She is alert and oriented to person, place, and time. Mental  "status is at baseline.      Cranial Nerves: No cranial nerve deficit.      Coordination: Coordination normal.   Psychiatric:         Mood and Affect: Mood normal.         Behavior: Behavior normal.         Thought Content: Thought content normal.       Results Review     I reviewed the patient's new clinical results.  Results from last 7 days   Lab Units 11/28/24  0354   WBC 10*3/mm3 11.81*   HEMOGLOBIN g/dL 9.6*   PLATELETS 10*3/mm3 274     Results from last 7 days   Lab Units 11/28/24  0354   SODIUM mmol/L 127*   POTASSIUM mmol/L 3.2*   CHLORIDE mmol/L 91*   CO2 mmol/L 25.5   BUN mg/dL 14   CREATININE mg/dL 1.15*   GLUCOSE mg/dL 118*   EGFR mL/min/1.73 51.4*       Results from last 7 days   Lab Units 11/28/24  0354   CALCIUM mg/dL 8.5*   MAGNESIUM mg/dL 2.2       No results found for: \"HGBA1C\", \"POCGLU\"    CT Angiogram Chest    Result Date: 11/28/2024    1. No pulmonary embolism.  2. Moderate to large right hydronephrosis and right hydroureter up to the level of the cervical mass suggesting at least partial obstructive effect on the right urinary collecting system, urology consultation advised. Indeterminate right renal lesion, as described.  3. Colonic diverticulosis. No acute inflammatory process of bowel is identified.  This report was finalized on 11/28/2024 2:37 PM by Dr. Rivas Souza M.D on Workstation: [email or web address]      CT Abdomen Pelvis With Contrast    Result Date: 11/28/2024    1. No pulmonary embolism.  2. Moderate to large right hydronephrosis and right hydroureter up to the level of the cervical mass suggesting at least partial obstructive effect on the right urinary collecting system, urology consultation advised. Indeterminate right renal lesion, as described.  3. Colonic diverticulosis. No acute inflammatory process of bowel is identified.  This report was finalized on 11/28/2024 2:37 PM by Dr. Rivas Souza M.D on Workstation: [email or web address]      US Pelvic, Non OB, Transvaginal Only    Result " Date: 2024  YOUR TEST RESULTS [email or web address] IS NOT A SUBSTITUTE FOR DISCUSSING THOSE RESULTS WITH YOUR HEALTH CARE PROVIDER. PLEASE CONTACT YOUR PROVIDER VIA [facility] TO DISCUSS ANY QUESTIONS OR CONCERNS YOU MAY HAVE REGARDING THESE TEST RESULTS. Gynecological Report               RADIOLOGY REPORT    FACILITY:  [facility] [place] ROOM NUMBER:   PATIENT NAME/:  DEVONTE GARZA S    1955 MRN NUMBER:  [id number] ACCOUNT NUMBER:  [id number] ACCESSION NUMBER:  [id number]    DATE OF EXAM:  2024 EXAMINATION(S):  US PELVIC, NON OB, TRANSVAGINAL ONLY       PERFORMED BY:     Attending:        Eliz Gordon MD  Performed By:     Veronica Dupree RDMS  Location:         [facility] ---------------------------------------------------------------------- ORDERS:     #  Description                       Code        Ordered By  1  US PELVIC, NON OB,                US#[id number]     ELIZ GORDON     TRANSVAGINAL ONLY                 4112 ----------------------------------------------------------------------     #  Order #                  Accession #              Episode #  1  [id number]                [id number]          [id number] ---------------------------------------------------------------------- SERVICE(S) PROVIDED:     US PELVIC, NON OB, TRANSVAGINAL ONLY                  US#[id number] ---------------------------------------------------------------------- HISTORY:     Age:   69 ---------------------------------------------------------------------- UTERUS:     Uterus:     Visualized  Position:   Anteverted  Size (cm)    L:  7.0       W:   4.2        H:  3.2  Description:   Pt is having  bleeding during the exam today ---------------------------------------------------------------------- ENDOMETRIUM:     Endometrium:          Visualized  Thickness(mm):        5.0     Comment:     Fluid distal endo=7mm ---------------------------------------------------------------------- CERVIX:      Abnormal tissue at cervical area--increased vascular  flow--5.3 x 3.4 x 3.4 cm ---------------------------------------------------------------------- CUL-DE-SAC:     A small amount of fluid was noted. ---------------------------------------------------------------------- RIGHT OVARY:  Status:   Not identified ---------------------------------------------------------------------- LEFT OVARY:     Status:   Not identified     Comment:     OVs not seen TV or TA ---------------------------------------------------------------------- COMMENTS:     This exam should be considered preliminary until  viewed and signed by a physician. P ----------------------------------------------------------------------                Veronica Dupree RDMS    Images reviewed US for PMB Abnormal US, concern for uterine or cervical malignancy- Mass of cervical area- 4o2r3te with hypervascularity. 7cm Ant uterus EMS 5mm with fluid in cavity Adnexa not seen.    Dictated by: Eliz Gordon MD    <AS><Preliminary - Signed Copy is Final>           I have personally reviewed all medications:  Scheduled Medications  atorvastatin, 80 mg, Oral, Nightly  carBAMazepine, 400 mg, Oral, TID  levothyroxine, 50 mcg, Oral, Daily  potassium chloride, 10 mEq, Intravenous, Q1H  pregabalin, 50 mg, Oral, BID  traZODone, 50 mg, Oral, Nightly    Infusions  sodium chloride, 100 mL/hr, Last Rate: 100 mL/hr (11/28/24 1441)    Diet  Diet: Cardiac; Healthy Heart (2-3 Na+); Fluid Consistency: Thin (IDDSI 0)    I have personally reviewed:  [x]  Laboratory   []  Microbiology   [x]  Radiology   [x]  EKG/Telemetry  [x]  Cardiology/Vascular   []  Pathology    [x]  Records       Assessment/Plan     Active Hospital Problems    Diagnosis  POA    **Dyspnea [R06.00]  Yes    HLD (hyperlipidemia) [E78.5]  Yes    Hypothyroidism (acquired) [E03.9]  Yes    Trigeminal neuralgia [G50.0]  Yes    Abnormal vaginal bleeding [N93.9]  Yes    Vitamin D deficiency [E55.9]  Yes    Chronic pain  syndrome [G89.4]  Yes    Chronic back pain [M54.9, G89.29]  Yes    Hypokalemia [E87.6]  Yes    Hyponatremia [E87.1]  Yes    Anemia [D64.9]  Yes    Mass of uterine cervix determined by ultrasound [N88.8]  Yes      Resolved Hospital Problems   No resolved problems to display.       68yo woman with hypoT4, HLD, chronic low back pain, and h/o trigeminal neuralgia, who was sent to [facility] ER from Gyn office with c/o one month of HOLLOWAY, fatigue, N/V, alteration in taste, weight loss, and intermittent vaginal bleeding. Pelvic US revealed hypervascular cervical mass that was biopsied. In [facility] ER she was found to be anemic, hyponatremic, and hypokalemic. BNP was elevated. She was transferred to [facility] for admission and further workup.    Cervical mass  Abnormal vaginal bleeding  Anemia NOS  Weight loss  Most likely has gyn malignancy and will require f/u with Gyn/Onc at [facility]  Heme/Onc consulted here  CT Chest okay  CT A/P showed obstruction of right urinary collecting system due to cervical mass as well as possible solid right renal lesion  Will consult   Continue PRN meds for nausea    HOLLOWAY  CTA neg for PE  Echo pending  Card consulted    Hyponatremia  Urine studies not c/w SIADH  Cr up, BPs low--suspect she is volume depleted  Continue IV NS and monitor    Hypokalemia  Replace with procotol  Mg++ level fine    HypoT4  TSH wnl  Continue L-T4    Chronic low back pain  Continue Lyrica and PRN Flexeril    Trigeminal neuralgia  Continue Tegretol      SCDs for DVT prophylaxis.  Full code.  Discussed with patient.  Anticipate discharge home with family, timing yet to be determined.  Expected discharge date/ time has not been documented.      Jefry Doss MD  [facility]  11/28/24  14:45 EST

## 2024-11-29 ENCOUNTER — ANESTHESIA (OUTPATIENT)
Dept: PERIOP | Facility: HOSPITAL | Age: 69
End: 2024-11-29
Payer: MEDICARE

## 2024-11-29 ENCOUNTER — ANESTHESIA EVENT (OUTPATIENT)
Dept: PERIOP | Facility: HOSPITAL | Age: 69
End: 2024-11-29
Payer: MEDICARE

## 2024-11-29 ENCOUNTER — APPOINTMENT (OUTPATIENT)
Dept: GENERAL RADIOLOGY | Facility: HOSPITAL | Age: 69
End: 2024-11-29
Payer: MEDICARE

## 2024-11-29 LAB
ALBUMIN SERPL-MCNC: 2.9 G/DL (ref 3.5–5.2)
ALBUMIN/GLOB SERPL: 0.9 G/DL
ALP SERPL-CCNC: 77 U/L (ref 39–117)
ALT SERPL W P-5'-P-CCNC: 21 U/L (ref 1–33)
ANION GAP SERPL CALCULATED.3IONS-SCNC: 10.6 MMOL/L (ref 5–15)
AST SERPL-CCNC: 30 U/L (ref 1–32)
BASOPHILS # BLD AUTO: 0.03 10*3/MM3 (ref 0–0.2)
BASOPHILS NFR BLD AUTO: 0.3 % (ref 0–1.5)
BILIRUB SERPL-MCNC: 0.3 MG/DL (ref 0–1.2)
BUN SERPL-MCNC: 15 MG/DL (ref 8–23)
BUN/CREAT SERPL: 13.2 (ref 7–25)
CALCIUM SPEC-SCNC: 8.2 MG/DL (ref 8.6–10.5)
CHLORIDE SERPL-SCNC: 96 MMOL/L (ref 98–107)
CO2 SERPL-SCNC: 22.4 MMOL/L (ref 22–29)
CREAT SERPL-MCNC: 1.14 MG/DL (ref 0.57–1)
DEPRECATED RDW RBC AUTO: 36.4 FL (ref 37–54)
DEPRECATED RDW RBC AUTO: 39.6 FL (ref 37–54)
EGFRCR SERPLBLD CKD-EPI 2021: 51.9 ML/MIN/1.73
EOSINOPHIL # BLD AUTO: 0.04 10*3/MM3 (ref 0–0.4)
EOSINOPHIL NFR BLD AUTO: 0.4 % (ref 0.3–6.2)
ERYTHROCYTE [DISTWIDTH] IN BLOOD BY AUTOMATED COUNT: 11.7 % (ref 12.3–15.4)
ERYTHROCYTE [DISTWIDTH] IN BLOOD BY AUTOMATED COUNT: 12.3 % (ref 12.3–15.4)
GLOBULIN UR ELPH-MCNC: 3.3 GM/DL
GLUCOSE SERPL-MCNC: 129 MG/DL (ref 65–99)
HCT VFR BLD AUTO: 22.9 % (ref 34–46.6)
HCT VFR BLD AUTO: 24.7 % (ref 34–46.6)
HGB BLD-MCNC: 7.9 G/DL (ref 12–15.9)
HGB BLD-MCNC: 8.8 G/DL (ref 12–15.9)
IMM GRANULOCYTES # BLD AUTO: 0.1 10*3/MM3 (ref 0–0.05)
IMM GRANULOCYTES NFR BLD AUTO: 0.9 % (ref 0–0.5)
LYMPHOCYTES # BLD AUTO: 1.07 10*3/MM3 (ref 0.7–3.1)
LYMPHOCYTES NFR BLD AUTO: 9.8 % (ref 19.6–45.3)
MAGNESIUM SERPL-MCNC: 2.2 MG/DL (ref 1.6–2.4)
MCH RBC QN AUTO: 30.7 PG (ref 26.6–33)
MCH RBC QN AUTO: 30.9 PG (ref 26.6–33)
MCHC RBC AUTO-ENTMCNC: 34.5 G/DL (ref 31.5–35.7)
MCHC RBC AUTO-ENTMCNC: 35.6 G/DL (ref 31.5–35.7)
MCV RBC AUTO: 86.7 FL (ref 79–97)
MCV RBC AUTO: 89.1 FL (ref 79–97)
MONOCYTES # BLD AUTO: 1.19 10*3/MM3 (ref 0.1–0.9)
MONOCYTES NFR BLD AUTO: 10.9 % (ref 5–12)
MRSA DNA SPEC QL NAA+PROBE: NORMAL
NEUTROPHILS NFR BLD AUTO: 77.7 % (ref 42.7–76)
NEUTROPHILS NFR BLD AUTO: 8.49 10*3/MM3 (ref 1.7–7)
NRBC BLD AUTO-RTO: 0 /100 WBC (ref 0–0.2)
PHOSPHATE SERPL-MCNC: 1.6 MG/DL (ref 2.5–4.5)
PHOSPHATE SERPL-MCNC: 3.6 MG/DL (ref 2.5–4.5)
PLATELET # BLD AUTO: 174 10*3/MM3 (ref 140–450)
PLATELET # BLD AUTO: 250 10*3/MM3 (ref 140–450)
PMV BLD AUTO: 9.6 FL (ref 6–12)
PMV BLD AUTO: 9.7 FL (ref 6–12)
POTASSIUM SERPL-SCNC: 4.5 MMOL/L (ref 3.5–5.2)
POTASSIUM SERPL-SCNC: 4.5 MMOL/L (ref 3.5–5.2)
PROT SERPL-MCNC: 6.2 G/DL (ref 6–8.5)
RBC # BLD AUTO: 2.57 10*6/MM3 (ref 3.77–5.28)
RBC # BLD AUTO: 2.85 10*6/MM3 (ref 3.77–5.28)
SODIUM SERPL-SCNC: 129 MMOL/L (ref 136–145)
WBC NRBC COR # BLD AUTO: 10.92 10*3/MM3 (ref 3.4–10.8)
WBC NRBC COR # BLD AUTO: 12.9 10*3/MM3 (ref 3.4–10.8)

## 2024-11-29 PROCEDURE — 25010000002 DEXAMETHASONE SODIUM PHOSPHATE 20 MG/5ML SOLUTION

## 2024-11-29 PROCEDURE — 87040 BLOOD CULTURE FOR BACTERIA: CPT | Performed by: HOSPITALIST

## 2024-11-29 PROCEDURE — 25810000003 SODIUM CHLORIDE 0.9 % SOLUTION: Performed by: HOSPITALIST

## 2024-11-29 PROCEDURE — C1769 GUIDE WIRE: HCPCS | Performed by: STUDENT IN AN ORGANIZED HEALTH CARE EDUCATION/TRAINING PROGRAM

## 2024-11-29 PROCEDURE — 25010000002 LIDOCAINE 2% SOLUTION

## 2024-11-29 PROCEDURE — 80053 COMPREHEN METABOLIC PANEL: CPT | Performed by: HOSPITALIST

## 2024-11-29 PROCEDURE — C1758 CATHETER, URETERAL: HCPCS | Performed by: STUDENT IN AN ORGANIZED HEALTH CARE EDUCATION/TRAINING PROGRAM

## 2024-11-29 PROCEDURE — 99231 SBSQ HOSP IP/OBS SF/LOW 25: CPT | Performed by: STUDENT IN AN ORGANIZED HEALTH CARE EDUCATION/TRAINING PROGRAM

## 2024-11-29 PROCEDURE — 25810000003 SODIUM CHLORIDE 0.9 % SOLUTION: Performed by: NURSE PRACTITIONER

## 2024-11-29 PROCEDURE — BT1D1ZZ FLUOROSCOPY OF RIGHT KIDNEY, URETER AND BLADDER USING LOW OSMOLAR CONTRAST: ICD-10-PCS | Performed by: STUDENT IN AN ORGANIZED HEALTH CARE EDUCATION/TRAINING PROGRAM

## 2024-11-29 PROCEDURE — 87641 MR-STAPH DNA AMP PROBE: CPT | Performed by: STUDENT IN AN ORGANIZED HEALTH CARE EDUCATION/TRAINING PROGRAM

## 2024-11-29 PROCEDURE — 25810000003 SODIUM CHLORIDE 0.9 % SOLUTION: Performed by: INTERNAL MEDICINE

## 2024-11-29 PROCEDURE — 87086 URINE CULTURE/COLONY COUNT: CPT | Performed by: STUDENT IN AN ORGANIZED HEALTH CARE EDUCATION/TRAINING PROGRAM

## 2024-11-29 PROCEDURE — 87077 CULTURE AEROBIC IDENTIFY: CPT | Performed by: STUDENT IN AN ORGANIZED HEALTH CARE EDUCATION/TRAINING PROGRAM

## 2024-11-29 PROCEDURE — 83735 ASSAY OF MAGNESIUM: CPT | Performed by: HOSPITALIST

## 2024-11-29 PROCEDURE — 88112 CYTOPATH CELL ENHANCE TECH: CPT | Performed by: STUDENT IN AN ORGANIZED HEALTH CARE EDUCATION/TRAINING PROGRAM

## 2024-11-29 PROCEDURE — 74420 UROGRAPHY RTRGR +-KUB: CPT

## 2024-11-29 PROCEDURE — 25010000002 VANCOMYCIN HCL 1.25 G RECONSTITUTED SOLUTION 1 EACH VIAL: Performed by: STUDENT IN AN ORGANIZED HEALTH CARE EDUCATION/TRAINING PROGRAM

## 2024-11-29 PROCEDURE — 25010000002 FENTANYL CITRATE (PF) 50 MCG/ML SOLUTION

## 2024-11-29 PROCEDURE — C2617 STENT, NON-COR, TEM W/O DEL: HCPCS | Performed by: STUDENT IN AN ORGANIZED HEALTH CARE EDUCATION/TRAINING PROGRAM

## 2024-11-29 PROCEDURE — 85027 COMPLETE CBC AUTOMATED: CPT | Performed by: HOSPITALIST

## 2024-11-29 PROCEDURE — 87186 SC STD MICRODIL/AGAR DIL: CPT | Performed by: STUDENT IN AN ORGANIZED HEALTH CARE EDUCATION/TRAINING PROGRAM

## 2024-11-29 PROCEDURE — 25010000002 ONDANSETRON PER 1 MG

## 2024-11-29 PROCEDURE — 84100 ASSAY OF PHOSPHORUS: CPT | Performed by: HOSPITALIST

## 2024-11-29 PROCEDURE — 25810000003 LACTATED RINGERS PER 1000 ML: Performed by: ANESTHESIOLOGY

## 2024-11-29 PROCEDURE — 0T768DZ DILATION OF RIGHT URETER WITH INTRALUMINAL DEVICE, VIA NATURAL OR ARTIFICIAL OPENING ENDOSCOPIC: ICD-10-PCS | Performed by: STUDENT IN AN ORGANIZED HEALTH CARE EDUCATION/TRAINING PROGRAM

## 2024-11-29 PROCEDURE — 25810000003 SODIUM CHLORIDE 0.9 % SOLUTION 250 ML FLEX CONT: Performed by: STUDENT IN AN ORGANIZED HEALTH CARE EDUCATION/TRAINING PROGRAM

## 2024-11-29 PROCEDURE — 99232 SBSQ HOSP IP/OBS MODERATE 35: CPT | Performed by: STUDENT IN AN ORGANIZED HEALTH CARE EDUCATION/TRAINING PROGRAM

## 2024-11-29 PROCEDURE — 84132 ASSAY OF SERUM POTASSIUM: CPT | Performed by: HOSPITALIST

## 2024-11-29 PROCEDURE — 25010000002 PROPOFOL 200 MG/20ML EMULSION

## 2024-11-29 PROCEDURE — 25510000001 IOPAMIDOL 61 % SOLUTION: Performed by: STUDENT IN AN ORGANIZED HEALTH CARE EDUCATION/TRAINING PROGRAM

## 2024-11-29 PROCEDURE — 84100 ASSAY OF PHOSPHORUS: CPT | Performed by: INTERNAL MEDICINE

## 2024-11-29 PROCEDURE — 25010000002 CEFAZOLIN PER 500 MG: Performed by: STUDENT IN AN ORGANIZED HEALTH CARE EDUCATION/TRAINING PROGRAM

## 2024-11-29 PROCEDURE — 25010000002 ACETAMINOPHEN 10 MG/ML SOLUTION

## 2024-11-29 PROCEDURE — 25010000002 PIPERACILLIN SOD-TAZOBACTAM PER 1 G: Performed by: STUDENT IN AN ORGANIZED HEALTH CARE EDUCATION/TRAINING PROGRAM

## 2024-11-29 PROCEDURE — 85025 COMPLETE CBC W/AUTO DIFF WBC: CPT | Performed by: NURSE PRACTITIONER

## 2024-11-29 DEVICE — URETERAL STENT
Type: IMPLANTABLE DEVICE | Site: URETER | Status: FUNCTIONAL
Brand: CONTOUR™

## 2024-11-29 RX ORDER — FLUMAZENIL 0.1 MG/ML
0.2 INJECTION INTRAVENOUS AS NEEDED
Status: DISCONTINUED | OUTPATIENT
Start: 2024-11-29 | End: 2024-11-29 | Stop reason: HOSPADM

## 2024-11-29 RX ORDER — IPRATROPIUM BROMIDE AND ALBUTEROL SULFATE 2.5; .5 MG/3ML; MG/3ML
3 SOLUTION RESPIRATORY (INHALATION) ONCE AS NEEDED
Status: DISCONTINUED | OUTPATIENT
Start: 2024-11-29 | End: 2024-11-29 | Stop reason: HOSPADM

## 2024-11-29 RX ORDER — SODIUM CHLORIDE 0.9 % (FLUSH) 0.9 %
3 SYRINGE (ML) INJECTION EVERY 12 HOURS SCHEDULED
Status: DISCONTINUED | OUTPATIENT
Start: 2024-11-29 | End: 2024-11-29 | Stop reason: HOSPADM

## 2024-11-29 RX ORDER — FENTANYL CITRATE 50 UG/ML
INJECTION, SOLUTION INTRAMUSCULAR; INTRAVENOUS AS NEEDED
Status: DISCONTINUED | OUTPATIENT
Start: 2024-11-29 | End: 2024-11-29 | Stop reason: SURG

## 2024-11-29 RX ORDER — PROMETHAZINE HYDROCHLORIDE 25 MG/1
25 SUPPOSITORY RECTAL ONCE AS NEEDED
Status: DISCONTINUED | OUTPATIENT
Start: 2024-11-29 | End: 2024-11-29 | Stop reason: HOSPADM

## 2024-11-29 RX ORDER — FAMOTIDINE 10 MG/ML
20 INJECTION, SOLUTION INTRAVENOUS ONCE
Status: COMPLETED | OUTPATIENT
Start: 2024-11-29 | End: 2024-11-29

## 2024-11-29 RX ORDER — ONDANSETRON 2 MG/ML
4 INJECTION INTRAMUSCULAR; INTRAVENOUS ONCE AS NEEDED
Status: DISCONTINUED | OUTPATIENT
Start: 2024-11-29 | End: 2024-11-29 | Stop reason: HOSPADM

## 2024-11-29 RX ORDER — ACETAMINOPHEN 10 MG/ML
INJECTION, SOLUTION INTRAVENOUS AS NEEDED
Status: DISCONTINUED | OUTPATIENT
Start: 2024-11-29 | End: 2024-11-29 | Stop reason: SURG

## 2024-11-29 RX ORDER — PROMETHAZINE HYDROCHLORIDE 25 MG/1
25 TABLET ORAL ONCE AS NEEDED
Status: DISCONTINUED | OUTPATIENT
Start: 2024-11-29 | End: 2024-11-29 | Stop reason: HOSPADM

## 2024-11-29 RX ORDER — FERROUS SULFATE 325(65) MG
325 TABLET ORAL EVERY OTHER DAY
Status: DISCONTINUED | OUTPATIENT
Start: 2024-11-29 | End: 2024-12-03 | Stop reason: HOSPADM

## 2024-11-29 RX ORDER — SODIUM CHLORIDE 0.9 % (FLUSH) 0.9 %
3-10 SYRINGE (ML) INJECTION AS NEEDED
Status: DISCONTINUED | OUTPATIENT
Start: 2024-11-29 | End: 2024-11-29 | Stop reason: HOSPADM

## 2024-11-29 RX ORDER — SODIUM CHLORIDE 9 MG/ML
100 INJECTION, SOLUTION INTRAVENOUS CONTINUOUS
Status: ACTIVE | OUTPATIENT
Start: 2024-11-29 | End: 2024-11-30

## 2024-11-29 RX ORDER — PHENYLEPHRINE HCL IN 0.9% NACL 1 MG/10 ML
SYRINGE (ML) INTRAVENOUS AS NEEDED
Status: DISCONTINUED | OUTPATIENT
Start: 2024-11-29 | End: 2024-11-29 | Stop reason: SURG

## 2024-11-29 RX ORDER — ONDANSETRON 2 MG/ML
INJECTION INTRAMUSCULAR; INTRAVENOUS AS NEEDED
Status: DISCONTINUED | OUTPATIENT
Start: 2024-11-29 | End: 2024-11-29 | Stop reason: SURG

## 2024-11-29 RX ORDER — NALOXONE HCL 0.4 MG/ML
0.2 VIAL (ML) INJECTION AS NEEDED
Status: DISCONTINUED | OUTPATIENT
Start: 2024-11-29 | End: 2024-11-29 | Stop reason: HOSPADM

## 2024-11-29 RX ORDER — IOPAMIDOL 612 MG/ML
INJECTION, SOLUTION INTRAVASCULAR AS NEEDED
Status: DISCONTINUED | OUTPATIENT
Start: 2024-11-29 | End: 2024-11-29 | Stop reason: HOSPADM

## 2024-11-29 RX ORDER — DIPHENHYDRAMINE HYDROCHLORIDE 50 MG/ML
12.5 INJECTION INTRAMUSCULAR; INTRAVENOUS
Status: DISCONTINUED | OUTPATIENT
Start: 2024-11-29 | End: 2024-11-29 | Stop reason: HOSPADM

## 2024-11-29 RX ORDER — FENTANYL CITRATE 50 UG/ML
50 INJECTION, SOLUTION INTRAMUSCULAR; INTRAVENOUS ONCE AS NEEDED
Status: DISCONTINUED | OUTPATIENT
Start: 2024-11-29 | End: 2024-11-29 | Stop reason: HOSPADM

## 2024-11-29 RX ORDER — HYDROMORPHONE HYDROCHLORIDE 1 MG/ML
0.25 INJECTION, SOLUTION INTRAMUSCULAR; INTRAVENOUS; SUBCUTANEOUS
Status: DISCONTINUED | OUTPATIENT
Start: 2024-11-29 | End: 2024-11-29 | Stop reason: HOSPADM

## 2024-11-29 RX ORDER — FENTANYL CITRATE 50 UG/ML
25 INJECTION, SOLUTION INTRAMUSCULAR; INTRAVENOUS
Status: DISCONTINUED | OUTPATIENT
Start: 2024-11-29 | End: 2024-11-29 | Stop reason: HOSPADM

## 2024-11-29 RX ORDER — DROPERIDOL 2.5 MG/ML
0.62 INJECTION, SOLUTION INTRAMUSCULAR; INTRAVENOUS
Status: DISCONTINUED | OUTPATIENT
Start: 2024-11-29 | End: 2024-11-29 | Stop reason: HOSPADM

## 2024-11-29 RX ORDER — SODIUM CHLORIDE, SODIUM LACTATE, POTASSIUM CHLORIDE, CALCIUM CHLORIDE 600; 310; 30; 20 MG/100ML; MG/100ML; MG/100ML; MG/100ML
9 INJECTION, SOLUTION INTRAVENOUS CONTINUOUS
Status: DISCONTINUED | OUTPATIENT
Start: 2024-11-29 | End: 2024-11-29

## 2024-11-29 RX ORDER — HYDROCODONE BITARTRATE AND ACETAMINOPHEN 5; 325 MG/1; MG/1
1 TABLET ORAL ONCE AS NEEDED
Status: DISCONTINUED | OUTPATIENT
Start: 2024-11-29 | End: 2024-11-29 | Stop reason: HOSPADM

## 2024-11-29 RX ORDER — MAGNESIUM HYDROXIDE 1200 MG/15ML
LIQUID ORAL AS NEEDED
Status: DISCONTINUED | OUTPATIENT
Start: 2024-11-29 | End: 2024-11-29 | Stop reason: HOSPADM

## 2024-11-29 RX ORDER — HYDROCODONE BITARTRATE AND ACETAMINOPHEN 7.5; 325 MG/1; MG/1
1 TABLET ORAL EVERY 4 HOURS PRN
Status: DISCONTINUED | OUTPATIENT
Start: 2024-11-29 | End: 2024-11-29 | Stop reason: HOSPADM

## 2024-11-29 RX ORDER — PROPOFOL 10 MG/ML
INJECTION, EMULSION INTRAVENOUS AS NEEDED
Status: DISCONTINUED | OUTPATIENT
Start: 2024-11-29 | End: 2024-11-29 | Stop reason: SURG

## 2024-11-29 RX ORDER — HYDRALAZINE HYDROCHLORIDE 20 MG/ML
5 INJECTION INTRAMUSCULAR; INTRAVENOUS
Status: DISCONTINUED | OUTPATIENT
Start: 2024-11-29 | End: 2024-11-29 | Stop reason: HOSPADM

## 2024-11-29 RX ORDER — DEXAMETHASONE SODIUM PHOSPHATE 4 MG/ML
INJECTION, SOLUTION INTRA-ARTICULAR; INTRALESIONAL; INTRAMUSCULAR; INTRAVENOUS; SOFT TISSUE AS NEEDED
Status: DISCONTINUED | OUTPATIENT
Start: 2024-11-29 | End: 2024-11-29 | Stop reason: SURG

## 2024-11-29 RX ORDER — LIDOCAINE HYDROCHLORIDE 20 MG/ML
INJECTION, SOLUTION INFILTRATION; PERINEURAL AS NEEDED
Status: DISCONTINUED | OUTPATIENT
Start: 2024-11-29 | End: 2024-11-29 | Stop reason: SURG

## 2024-11-29 RX ORDER — EPHEDRINE SULFATE 50 MG/ML
5 INJECTION, SOLUTION INTRAVENOUS ONCE AS NEEDED
Status: DISCONTINUED | OUTPATIENT
Start: 2024-11-29 | End: 2024-11-29 | Stop reason: HOSPADM

## 2024-11-29 RX ORDER — LIDOCAINE HYDROCHLORIDE 10 MG/ML
0.5 INJECTION, SOLUTION INFILTRATION; PERINEURAL ONCE AS NEEDED
Status: DISCONTINUED | OUTPATIENT
Start: 2024-11-29 | End: 2024-11-29 | Stop reason: HOSPADM

## 2024-11-29 RX ORDER — LABETALOL HYDROCHLORIDE 5 MG/ML
5 INJECTION, SOLUTION INTRAVENOUS
Status: DISCONTINUED | OUTPATIENT
Start: 2024-11-29 | End: 2024-11-29 | Stop reason: HOSPADM

## 2024-11-29 RX ADMIN — LEVOTHYROXINE SODIUM 50 MCG: 0.05 TABLET ORAL at 08:14

## 2024-11-29 RX ADMIN — FERROUS SULFATE TAB 325 MG (65 MG ELEMENTAL FE) 325 MG: 325 (65 FE) TAB at 16:44

## 2024-11-29 RX ADMIN — CARBAMAZEPINE 400 MG: 200 TABLET ORAL at 23:11

## 2024-11-29 RX ADMIN — VANCOMYCIN HYDROCHLORIDE 1250 MG: 1.25 INJECTION, POWDER, LYOPHILIZED, FOR SOLUTION INTRAVENOUS at 16:44

## 2024-11-29 RX ADMIN — PIPERACILLIN AND TAZOBACTAM 3.38 G: 3; .375 INJECTION, POWDER, FOR SOLUTION INTRAVENOUS at 18:07

## 2024-11-29 RX ADMIN — FENTANYL CITRATE 50 MCG: 50 INJECTION, SOLUTION INTRAMUSCULAR; INTRAVENOUS at 12:08

## 2024-11-29 RX ADMIN — SODIUM PHOSPHATE, MONOBASIC, MONOHYDRATE AND SODIUM PHOSPHATE, DIBASIC, ANHYDROUS 15 MMOL: 276; 142 INJECTION, SOLUTION INTRAVENOUS at 08:13

## 2024-11-29 RX ADMIN — PROPOFOL 200 MG: 10 INJECTION, EMULSION INTRAVENOUS at 11:59

## 2024-11-29 RX ADMIN — ACETAMINOPHEN 1000 MG: 1000 INJECTION INTRAVENOUS at 12:27

## 2024-11-29 RX ADMIN — ONDANSETRON 4 MG: 2 INJECTION INTRAMUSCULAR; INTRAVENOUS at 12:12

## 2024-11-29 RX ADMIN — TRAZODONE HYDROCHLORIDE 50 MG: 50 TABLET ORAL at 22:15

## 2024-11-29 RX ADMIN — FAMOTIDINE 20 MG: 10 INJECTION INTRAVENOUS at 11:40

## 2024-11-29 RX ADMIN — ATORVASTATIN CALCIUM 80 MG: 80 TABLET, FILM COATED ORAL at 22:15

## 2024-11-29 RX ADMIN — SODIUM CHLORIDE 2000 MG: 900 INJECTION INTRAVENOUS at 11:49

## 2024-11-29 RX ADMIN — SODIUM PHOSPHATE, MONOBASIC, MONOHYDRATE AND SODIUM PHOSPHATE, DIBASIC, ANHYDROUS 15 MMOL: 276; 142 INJECTION, SOLUTION INTRAVENOUS at 15:12

## 2024-11-29 RX ADMIN — PREGABALIN 50 MG: 50 CAPSULE ORAL at 08:14

## 2024-11-29 RX ADMIN — CARBAMAZEPINE 400 MG: 200 TABLET ORAL at 08:14

## 2024-11-29 RX ADMIN — SODIUM CHLORIDE, SODIUM LACTATE, POTASSIUM CHLORIDE, CALCIUM CHLORIDE 9 ML/HR: 20; 30; 600; 310 INJECTION, SOLUTION INTRAVENOUS at 11:40

## 2024-11-29 RX ADMIN — POTASSIUM CHLORIDE 40 MEQ: 750 TABLET, EXTENDED RELEASE ORAL at 02:00

## 2024-11-29 RX ADMIN — Medication 100 MCG: at 12:12

## 2024-11-29 RX ADMIN — SODIUM CHLORIDE 500 ML: 9 INJECTION, SOLUTION INTRAVENOUS at 22:16

## 2024-11-29 RX ADMIN — DEXAMETHASONE SODIUM PHOSPHATE 4 MG: 4 INJECTION, SOLUTION INTRAMUSCULAR; INTRAVENOUS at 12:12

## 2024-11-29 RX ADMIN — PREGABALIN 50 MG: 50 CAPSULE ORAL at 22:15

## 2024-11-29 RX ADMIN — LIDOCAINE HYDROCHLORIDE 80 MG: 20 INJECTION, SOLUTION INFILTRATION; PERINEURAL at 11:59

## 2024-11-29 RX ADMIN — SODIUM CHLORIDE 100 ML/HR: 9 INJECTION, SOLUTION INTRAVENOUS at 15:09

## 2024-11-29 NOTE — ANESTHESIA PREPROCEDURE EVALUATION
Anesthesia Evaluation     Patient summary reviewed and Nursing notes reviewed   NPO Solid Status: > 8 hours  NPO Liquid Status: > 2 hours           Airway   Mallampati: II  TM distance: >3 FB  Neck ROM: full  No difficulty expected  Comment: LMA 4 used in past  Dental    (+) edentulous    Pulmonary - normal exam    breath sounds clear to auscultation  (+) ,shortness of breath  Cardiovascular     ECG reviewed  Rhythm: regular  Rate: normal    (+) hyperlipidemia    ROS comment: EF 69%, trivial pericardial effusion by ECHO yesterday  PE comment: ST/rate 105    Neuro/Psych  (+) numbness    ROS Comment: Trigeminal neuralgia  GI/Hepatic/Renal/Endo    (+) thyroid problem hypothyroidism    ROS Comment: Hyponatremia (Na 129)    Musculoskeletal     (+) back pain, chronic pain  Abdominal  - normal exam   Substance History      OB/GYN          Other   blood dyscrasia anemia,       Other Comment: Hgb 8.8                Anesthesia Plan    ASA 3     general     (LMA)  intravenous induction     Anesthetic plan, risks, benefits, and alternatives have been provided, discussed and informed consent has been obtained with: patient.      CODE STATUS:    Code Status (Patient has no pulse and is not breathing): CPR (Attempt to Resuscitate)  Medical Interventions (Patient has pulse or is breathing): Full

## 2024-11-29 NOTE — ANESTHESIA PROCEDURE NOTES
Airway  Urgency: elective    Date/Time: 11/29/2024 12:02 PM  Airway not difficult    General Information and Staff    Patient location during procedure: OR  Anesthesiologist: Austyn Brewer MD  CRNA/CAA: Марина Roman CRNA    Indications and Patient Condition  Indications for airway management: airway protection    Preoxygenated: yes  MILS maintained throughout  Mask difficulty assessment: 0 - not attempted    Final Airway Details  Final airway type: supraglottic airway      Successful airway: classic  Size 3     Number of attempts at approach: 1  Assessment: lips, teeth, and gum same as pre-op and atraumatic intubation

## 2024-11-29 NOTE — ANESTHESIA POSTPROCEDURE EVALUATION
Patient: Ambreen Espinoza    Procedure Summary       Date: 11/29/24 Room / Location: [facility] OR  / [facility] MAIN OR    Anesthesia Start: 1158 Anesthesia Stop: 1234    Procedure: CYSTOSCOPY URETERAL CATHETER/STENT INSERTION (Right) Diagnosis:     Surgeons: Marcelo Soto MD Provider: Austyn Brewer MD    Anesthesia Type: general ASA Status: 3            Anesthesia Type: general    Vitals  Vitals Value Taken Time   /59 11/29/24 1315   Temp 39.1 °C (102.4 °F) 11/29/24 1236   Pulse 94 11/29/24 1325   Resp 16 11/29/24 1300   SpO2 99 % 11/29/24 1325   Vitals shown include unfiled device data.        Post Anesthesia Care and Evaluation    Patient location during evaluation: PACU  Patient participation: complete - patient participated  Level of consciousness: awake  Pain management: satisfactory to patient    Airway patency: patent  Anesthetic complications: No anesthetic complications  PONV Status: controlled  Cardiovascular status: acceptable  Respiratory status: acceptable  Hydration status: acceptable

## 2024-11-29 NOTE — PROGRESS NOTES
"    Name: Ambreen Espinoza ADMIT: 2024   : 1955  PCP: Lisseth Rankin APRN    MRN: [id number] LOS: 0 days   AGE/SEX: 69 y.o. female  ROOM: [facility]     Subjective   Subjective   Pt feeling okay this afternoon. A little \"loopy\" since returning from cysto. Nausea improved. She is voiding okay--no LUTS. No flank pain or abd pain. No F/C/NS. No SOA at rest. No CP or palp.        Objective   Objective   Vital Signs  Temp:  [97.5 °F (36.4 °C)-102.4 °F (39.1 °C)] 98.9 °F (37.2 °C)  Heart Rate:  [] 89  Resp:  [16-18] 18  BP: ()/(51-73) 103/63  SpO2:  [97 %-100 %] 98 %  on  Flow (L/min) (Oxygen Therapy):  [4] 4;   Device (Oxygen Therapy): room air  Body mass index is 26.41 kg/m².    (No change in exam today)    Physical Exam  Vitals and nursing note reviewed.   Constitutional:       General: She is not in acute distress.     Appearance: She is ill-appearing (chronically). She is not toxic-appearing or diaphoretic.   HENT:      Head: Normocephalic.      Nose: Nose normal.      Mouth/Throat:      Mouth: Mucous membranes are moist.      Pharynx: Oropharynx is clear.   Eyes:      General: No scleral icterus.        Right eye: No discharge.         Left eye: No discharge.      Conjunctiva/sclera: Conjunctivae normal.   Cardiovascular:      Rate and Rhythm: Normal rate and regular rhythm.      Pulses: Normal pulses.   Pulmonary:      Effort: Pulmonary effort is normal. No respiratory distress.      Breath sounds: Normal breath sounds. No wheezing or rales.   Abdominal:      General: Bowel sounds are normal. There is no distension.      Palpations: Abdomen is soft.      Tenderness: There is no abdominal tenderness.   Genitourinary:     Comments: Dowd in place, clear yellow urine in bag  Musculoskeletal:         General: No swelling.      Cervical back: Neck supple.   Skin:     General: Skin is warm and dry.      Capillary Refill: Capillary refill takes less than 2 seconds.      Coloration: Skin is " "pale. Skin is not jaundiced.   Neurological:      General: No focal deficit present.      Mental Status: She is alert and oriented to person, place, and time. Mental status is at baseline.      Cranial Nerves: No cranial nerve deficit.      Coordination: Coordination normal.   Psychiatric:         Mood and Affect: Mood normal.         Behavior: Behavior normal.         Thought Content: Thought content normal.       Results Review     I reviewed the patient's new clinical results.  Results from last 7 days   Lab Units 11/29/24  0544 11/28/24  0354   WBC 10*3/mm3 12.90* 11.81*   HEMOGLOBIN g/dL 8.8* 9.6*   PLATELETS 10*3/mm3 250 274     Results from last 7 days   Lab Units 11/29/24  0544 11/28/24  1934 11/28/24  0354   SODIUM mmol/L 129*  --  127*   POTASSIUM mmol/L 4.5  4.5 3.5 3.2*   CHLORIDE mmol/L 96*  --  91*   CO2 mmol/L 22.4  --  25.5   BUN mg/dL 15  --  14   CREATININE mg/dL 1.14*  --  1.15*   GLUCOSE mg/dL 129*  --  118*   EGFR mL/min/1.73 51.9*  --  51.4*     Results from last 7 days   Lab Units 11/29/24  0544   ALBUMIN g/dL 2.9*   BILIRUBIN mg/dL 0.3   ALK PHOS U/L 77   AST (SGOT) U/L 30   ALT (SGPT) U/L 21     Results from last 7 days   Lab Units 11/29/24  0544 11/28/24  0354   CALCIUM mg/dL 8.2* 8.5*   ALBUMIN g/dL 2.9*  --    MAGNESIUM mg/dL 2.2 2.2   PHOSPHORUS mg/dL 1.6*  --        No results found for: \"HGBA1C\", \"POCGLU\"    FL Retrograde Pyelogram In OR    Result Date: 11/29/2024   As described.  This report was finalized on 11/29/2024 1:23 PM by Dr. Rivas Souza M.D on Workstation: [email or web address]      CT Angiogram Chest    Result Date: 11/28/2024    1. No pulmonary embolism.  2. Moderate to large right hydronephrosis and right hydroureter up to the level of the cervical mass suggesting at least partial obstructive effect on the right urinary collecting system, urology consultation advised. Indeterminate right renal lesion, as described.  3. Colonic diverticulosis. No acute inflammatory process of " bowel is identified.  This report was finalized on 11/28/2024 2:37 PM by Dr. Rivas Souza M.D on Workstation: [email or web address]      CT Abdomen Pelvis With Contrast    Result Date: 11/28/2024    1. No pulmonary embolism.  2. Moderate to large right hydronephrosis and right hydroureter up to the level of the cervical mass suggesting at least partial obstructive effect on the right urinary collecting system, urology consultation advised. Indeterminate right renal lesion, as described.  3. Colonic diverticulosis. No acute inflammatory process of bowel is identified.  This report was finalized on 11/28/2024 2:37 PM by Dr. Rivas Souza M.D on Workstation: [email or web address]       I have personally reviewed all medications:  Scheduled Medications  atorvastatin, 80 mg, Oral, Nightly  carBAMazepine, 400 mg, Oral, TID  ferrous sulfate, 325 mg, Oral, Every Other Day  levothyroxine, 50 mcg, Oral, Daily  piperacillin-tazobactam, 3.375 g, Intravenous, Once  piperacillin-tazobactam, 3.375 g, Intravenous, Q8H  pregabalin, 50 mg, Oral, BID  traZODone, 50 mg, Oral, Nightly  vancomycin, 1,250 mg, Intravenous, Q24H    Infusions  lactated ringers, 9 mL/hr, Last Rate: 100 mL/hr (11/29/24 1158)  Pharmacy to dose vancomycin,   Pharmacy to Dose Zosyn,     Diet  Diet: Cardiac; Healthy Heart (2-3 Na+); Fluid Consistency: Thin (IDDSI 0)    I have personally reviewed:  [x]  Laboratory   []  Microbiology   [x]  Radiology   [x]  EKG/Telemetry  [x]  Cardiology/Vascular   []  Pathology    []  Records       Assessment/Plan     Active Hospital Problems    Diagnosis  POA    **Dyspnea [R06.00]  Yes    HLD (hyperlipidemia) [E78.5]  Yes    Hypothyroidism (acquired) [E03.9]  Yes    Trigeminal neuralgia [G50.0]  Yes    Abnormal vaginal bleeding [N93.9]  Yes    Vitamin D deficiency [E55.9]  Yes    Chronic pain syndrome [G89.4]  Yes    Chronic back pain [M54.9, G89.29]  Yes    Hypokalemia [E87.6]  Yes    Hyponatremia [E87.1]  Yes    Anemia [D64.9]  Yes    Mass  of uterine cervix determined by ultrasound [N88.8]  Yes      Resolved Hospital Problems   No resolved problems to display.       68yo woman with hypoT4, HLD, chronic low back pain, and h/o trigeminal neuralgia, who was sent to [facility] ER from Gyn office with c/o one month of HOLLOWAY, fatigue, N/V, alteration in taste, weight loss, and intermittent vaginal bleeding. Pelvic US revealed hypervascular cervical mass that was biopsied. In [facility] ER she was found to be anemic, hyponatremic, and hypokalemic. BNP was elevated. She was transferred to [facility] for admission and further workup.    Cervical mass  Abnormal vaginal bleeding  Anemia NOS  Weight loss  Most likely has gyn malignancy and will require f/u with Gyn/Onc at [facility] (Dr. Moreno)  Heme/Onc consulted here  CT Chest okay  CT A/P showed obstruction of right urinary collecting system due to cervical mass as well as possible solid right renal lesion (see below)  Continue PRN meds for nausea  Oral iron with Vit C ordered    Right hydronephrosis  Right renal lesion  Fever   consulted  Cysto today with right ureteral stent and alvarez placement  T104 during procedure   started Zosyn/Vanc and sent urine for culture and cytology  I have ordered blood cultures  Will need further imaging of right renal lesion when hydronephrosis resolved and Cr improved    HOLLOWAY  CTA neg for PE  Echo reassuring  Card has signed off    Hyponatremia  Urine studies not c/w SIADH  Cr up, BPs low--suspect she is volume depleted and some component of obstruction (see above)  Na+ up somewhat this AM after IV NS  Continue IV NS and monitor Cr and Na+ in AM    Hypokalemia  Replaced with procotol  Mg++ level fine    HypoT4  TSH wnl  Continue L-T4    Chronic low back pain  Continue Lyrica and PRN Flexeril    Trigeminal neuralgia  Continue Tegretol      SCDs for DVT prophylaxis.  Full code.  Discussed with patient and RN.  Anticipate discharge home with family, timing yet to be determined.  Expected  discharge date/ time has not been documented.      Jefry Doss MD  [facility]  11/29/24  14:44 EST

## 2024-11-29 NOTE — PROGRESS NOTES
Patient Name: Ambreen Espinoza  Age/Sex: 69 y.o. female  : 1955  MRN: [id number]    Date of Admission: 2024  Date of Encounter Visit: 24  Encounter Provider: Fransisco Fox MD   Place of Service: [facility] CARDIOLOGY    Admit Diagnosis: Dyspnea [R06.00]    Subjective:     No acute events overnight.  No acute complaints.  Doing well this morning.    Current Medications:    Current Facility-Administered Medications:     acetaminophen (TYLENOL) tablet 650 mg, 650 mg, Oral, Q4H PRN, 650 mg at 24 1643 **OR** acetaminophen (TYLENOL) 160 MG/5ML oral solution 650 mg, 650 mg, Oral, Q4H PRN **OR** acetaminophen (TYLENOL) suppository 650 mg, 650 mg, Rectal, Q4H PRN, Марина Paredes MD    atorvastatin (LIPITOR) tablet 80 mg, 80 mg, Oral, Nightly, Марина Paredes MD, 80 mg at 24 2105    sennosides-docusate (PERICOLACE) 8.6-50 MG per tablet 2 tablet, 2 tablet, Oral, BID PRN **AND** polyethylene glycol (MIRALAX) packet 17 g, 17 g, Oral, Daily PRN **AND** bisacodyl (DULCOLAX) EC tablet 5 mg, 5 mg, Oral, Daily PRN **AND** bisacodyl (DULCOLAX) suppository 10 mg, 10 mg, Rectal, Daily PRN, Марина Paredes MD    carBAMazepine (TEGretol) tablet 400 mg, 400 mg, Oral, TID, Марина Paredes MD, 400 mg at 24 0814    cyclobenzaprine (FLEXERIL) tablet 10 mg, 10 mg, Oral, BID PRN, Марина Paredes MD    ferrous sulfate tablet 325 mg, 325 mg, Oral, Every Other Day, Daphne Yi MD    hydrOXYzine pamoate (VISTARIL) capsule 25 mg, 25 mg, Oral, TID PRN, Марина Paredes MD    levothyroxine (SYNTHROID, LEVOTHROID) tablet 50 mcg, 50 mcg, Oral, Daily, Марина Paredes MD, 50 mcg at 24 0814    melatonin tablet 2.5 mg, 2.5 mg, Oral, Nightly PRN, Марина Paredes MD    ondansetron ODT (ZOFRAN-ODT) disintegrating tablet 4 mg, 4 mg, Oral, Q6H PRN **OR** ondansetron (ZOFRAN) injection 4 mg, 4 mg, Intravenous, Q6H PRN,  Марина Paredes MD, 4 mg at 11/28/24 0816    Phosphorus Replacement - Follow Nurse / BPA Driven Protocol, , Not Applicable, PRN, John Werner MD    Potassium Replacement - Follow Nurse / BPA Driven Protocol, , Not Applicable, PRN, Jefry Doss MD    pregabalin (LYRICA) capsule 50 mg, 50 mg, Oral, BID, Марина Paredes MD, 50 mg at 11/29/24 0814    prochlorperazine (COMPAZINE) injection 10 mg, 10 mg, Intravenous, Q6H PRN, Jefry Doss MD, 10 mg at 11/28/24 1143    sodium phosphates 15 mmol in 250 mL 0.9% sodium chloride IVPB, 15 mmol, Intravenous, Q3H, John Werner MD, 15 mmol at 11/29/24 0813    traZODone (DESYREL) tablet 50 mg, 50 mg, Oral, Nightly, Марина Paredes MD, 50 mg at 11/28/24 2105    Prior to Admission Medications:  Medications Prior to Admission   Medication Sig Dispense Refill Last Dose/Taking    acetaminophen (TYLENOL) 500 MG tablet Take 1 tablet by mouth Every 4 (Four) Hours As Needed for Mild Pain.   11/27/2024    atorvastatin (LIPITOR) 80 MG tablet Take 1 tablet by mouth Every Night.   11/26/2024    carBAMazepine (TEGretol) 200 MG tablet Take 1 tablet by mouth 3 (Three) Times a Day. Pt takes 2 tablets (400mg) TID   11/26/2024    ezetimibe (ZETIA) 10 MG tablet Take 1 tablet by mouth Daily. Pt takes 2 tablets (20mg) once daily   11/26/2024    hydrOXYzine pamoate (VISTARIL) 25 MG capsule Take 1 capsule by mouth 3 (Three) Times a Day As Needed for Itching. 1-2 capsule TID PRN   Taking As Needed    ibuprofen (ADVIL,MOTRIN) 600 MG tablet Take 1 tablet by mouth Every 6 (Six) Hours As Needed for Mild Pain.   11/27/2024    levothyroxine (SYNTHROID, LEVOTHROID) 50 MCG tablet Take 1 tablet by mouth Daily.   11/26/2024    meloxicam (MOBIC) 15 MG tablet Take 1 tablet by mouth Daily.   11/26/2024    ondansetron (ZOFRAN) 4 MG tablet Take 1 tablet by mouth Every 8 (Eight) Hours As Needed for Nausea or Vomiting.   11/27/2024    pregabalin (LYRICA) 50 MG capsule Take 1  capsule by mouth 2 (Two) Times a Day.   11/26/2024    traZODone (DESYREL) 50 MG tablet Take 1 tablet by mouth Every Night.   11/26/2024    vitamin D (ERGOCALCIFEROL) 1.25 MG (19548 UT) capsule capsule Take 1 capsule by mouth 1 (One) Time Per Week. Sunday   Past Week    cyclobenzaprine (FLEXERIL) 10 MG tablet Take 1 tablet by mouth 2 (Two) Times a Day As Needed for Muscle Spasms.          Past Medical History     History reviewed. No pertinent past medical history.  History reviewed. No pertinent surgical history.    Family History     History reviewed. No pertinent family history.      Social History     Social History     Socioeconomic History    Marital status:    Tobacco Use    Smoking status: Never    Smokeless tobacco: Never   Vaping Use    Vaping status: Never Used   Substance and Sexual Activity    Alcohol use: Never    Drug use: Never    Sexual activity: Defer       Allergies:  No Known Allergies         Objective:     Objective:  Temp:  [97.5 °F (36.4 °C)-99.5 °F (37.5 °C)] 99.5 °F (37.5 °C)  Heart Rate:  [71-98] 98  Resp:  [16-18] 18  BP: (84-99)/(52-68) 99/68    Intake/Output Summary (Last 24 hours) at 11/29/2024 1001  Last data filed at 11/28/2024 1700  Gross per 24 hour   Intake 1214 ml   Output --   Net 1214 ml     Body mass index is 26.41 kg/m².      11/27/24  1930 11/28/24  1355 11/29/24  0509   Weight: 71.6 kg (157 lb 14.4 oz) 71 kg (156 lb 8.4 oz) 71.9 kg (158 lb 8.2 oz)                 Physical Exam:   Gen: Well nourished; Alert  Skin: no visible rashes and no abnormalities with palpation  Eyes: no evidence of visual defects and normal sclera  Ears: no abnormalities.  Mouth: normal teeth and appropriately moist mucous membranes  Chest: clear to auscultation. There is normal respiratory effort and expansion.  CV: examination showed a regular rhythm. S1 and S2 were normal.   Abd: no visible distension.   Neurologic:Cranial nerves appear intact; Sensation normal; no localizing signs    Lab  Review:     Results from last 7 days   Lab Units 11/29/24  0544 11/28/24  1934 11/28/24  0354   SODIUM mmol/L 129*  --  127*   POTASSIUM mmol/L 4.5  4.5 3.5 3.2*   CHLORIDE mmol/L 96*  --  91*   CO2 mmol/L 22.4  --  25.5   BUN mg/dL 15  --  14   CREATININE mg/dL 1.14*  --  1.15*   GLUCOSE mg/dL 129*  --  118*   CALCIUM mg/dL 8.2*  --  8.5*       Results from last 7 days   Lab Units 11/27/24  2247 11/27/24  2047   HSTROP T ng/L 6 <6     Results from last 7 days   Lab Units 11/29/24  0544   WBC 10*3/mm3 12.90*   HEMOGLOBIN g/dL 8.8*   HEMATOCRIT % 24.7*   PLATELETS 10*3/mm3 250             Results from last 7 days   Lab Units 11/29/24  0544   MAGNESIUM mg/dL 2.2                 Results from last 7 days   Lab Units 11/28/24  0354   TSH uIU/mL 0.514       Imaging:    chest X-ray    Results for orders placed during the hospital encounter of 11/27/24    Adult Transthoracic Echo Complete W/ Cont if Necessary Per Protocol    Interpretation Summary    Left ventricular systolic function is normal. Calculated left ventricular EF = 68.5%    Left ventricular wall thickness is consistent with hypertrophy. Sigmoid-shaped ventricular septum is present.    Left ventricular diastolic function was normal.    There is a trivial pericardial effusion.    The study is technically difficult for diagnosis.        I personally viewed and interpreted the patient's EKG/Telemetry data.    Assessment/ Plan:     Dyspnea    HLD (hyperlipidemia)    Hypothyroidism (acquired)    Trigeminal neuralgia    Abnormal vaginal bleeding    Vitamin D deficiency    Chronic pain syndrome    Chronic back pain    Hypokalemia    Hyponatremia    Anemia    Mass of uterine cervix determined by ultrasound    # Shortness of breath.  - EKG normal sinus rhythm unremarkable.  - Chest x-ray unremarkable.  - CTA with no pulmonary embolism.  - Echocardiogram 11/27/2024 with sigmoid shaped ventricular septum.  Low likelihood of contribution from this to her current symptoms.   EF is normal 68.5% systolic function is normal.  - Agree with continued workup by oncology/urology.  - Cardiology will sign off for now.  Please reconsult as needed.          Fransisco Fox MD  [facility]  11/29/24  10:01 EST

## 2024-11-29 NOTE — PROGRESS NOTES
"[facility] Clinical Pharmacy Services: Vancomycin Pharmacokinetic Initial Consult Note    Ambreen Espinoza is a 69 y.o. female who is on day 1 of pharmacy to dose vancomycin.    Indication: Urinary Tract Infection  Consulting Provider: Marcelo Soto MD  Planned Duration of Therapy: 4 days   Loading Dose Ordered or Given: no  MRSA PCR ordered   Culture/Source: UCx in process   Target: -600 mg/L.hr   Pertinent Vanc Dosing History: N/A   Other Antimicrobials: Zosyn     Vitals/Labs  Ht: 165 cm (64.96\"); Wt: 71.9 kg (158 lb 8.2 oz)  Temp Readings from Last 1 Encounters:   11/29/24 100.2 °F (37.9 °C) (Oral)    Estimated Creatinine Clearance: 45.6 mL/min (A) (by C-G formula based on SCr of 1.14 mg/dL (H)).   Results from last 7 days   Lab Units 11/29/24  0544 11/28/24  0354   CREATININE mg/dL 1.14* 1.15*   WBC 10*3/mm3 12.90* 11.81*     Assessment/Plan:    Vancomycin Dose:   1250 mg IV every  24  hours  Predictive AUC level for the dose ordered is 507 mg/L.hr, which is within the target of 400-600 mg/L.hr  Vanc Trough has not been ordered at this time. Consider ordering if duration of therapy extends beyond 4 days/doses.   SCr ordered daily with AM labs.     Thank you for involving pharmacy in this patient's care. Please contact pharmacy with any questions or concerns.                           Fransisco Vuong, PharmD  Clinical Pharmacist    "

## 2024-11-29 NOTE — PROGRESS NOTES
[facility] INPATIENT PROGRESS NOTE    Length of Stay:  0 days    CHIEF COMPLAINT/REASON FOR VISIT:  Cervical mass  anemia    SUBJECTIVE:   11/29/2024: patient reports new onset cough, she had scans done yesterday. We reviewed them. Denies any other issue.s     ROS:  All systems reviewed and found to be negative except for that noted above    OBJECTIVE:  Vitals:    11/28/24 2131 11/28/24 2308 11/29/24 0509 11/29/24 0725   BP: 90/52 94/59  99/68   BP Location:  Right arm  Right arm   Patient Position:  Lying  Lying   Pulse:  71  98   Resp:  18  18   Temp:  97.5 °F (36.4 °C)  99.5 °F (37.5 °C)   TempSrc:  Oral  Oral   SpO2:  97%  99%   Weight:   71.9 kg (158 lb 8.2 oz)    Height:             PHYSICAL EXAMINATION:  General: Well built female in NAD. AO x 3  Chest/Lungs: CTABL. No r/r/w  Heart:S1,2 normal  Abdomen/GI:Soft. NT BS+  Extremities:No swelling    DIAGNOSTIC DATA:  Results Review:     I reviewed the patient's new clinical results.    Results from last 7 days   Lab Units 11/29/24  0544 11/28/24  0354   WBC 10*3/mm3 12.90* 11.81*   HEMOGLOBIN g/dL 8.8* 9.6*   HEMATOCRIT % 24.7* 27.4*   PLATELETS 10*3/mm3 250 274      Results from last 7 days   Lab Units 11/29/24  0544 11/28/24  1934 11/28/24  0354   SODIUM mmol/L 129*  --  127*   POTASSIUM mmol/L 4.5  4.5 3.5 3.2*   CHLORIDE mmol/L 96*  --  91*   CO2 mmol/L 22.4  --  25.5   BUN mg/dL 15  --  14   CREATININE mg/dL 1.14*  --  1.15*   CALCIUM mg/dL 8.2*  --  8.5*   BILIRUBIN mg/dL 0.3  --   --    ALK PHOS U/L 77  --   --    ALT (SGPT) U/L 21  --   --    AST (SGOT) U/L 30  --   --    GLUCOSE mg/dL 129*  --  118*      Lab Results   Component Value Date    NEUTROABS 2.17 03/13/2024         Results from last 7 days   Lab Units 11/29/24  0544   MAGNESIUM mg/dL 2.2         IMAGING:    CT Abdomen Pelvis With Contrast (11/28/2024 14:02)  CT Angiogram Chest (11/28/2024 14:02)    Assessment & Plan   ASSESSMENT/PLAN:  This is a 69 y.o. female with:     *Cervical  mass status post endometrial biopsy on 11/27/2024 at [place], results pending  *Postmenopausal bleeding, ongoing for a month and a half  *Nausea, vomiting, ongoing for last 1 month  *Loss of appetite for the last 1 month  *Unintentional weight loss, 10 pounds in last 1 month  11/27/2024 pelvic ultrasound-abnormal tissue at cervical area with increased vascularity-5.3 x 3.4 x 3.4 cm.  She is status post endometrial biopsy with Tiff Fontaine at [place] yesterday, 11/27/2024.  Results currently pending   We will obtain CT chest abdomen pelvis for evaluation.  She will ultimately need referral to gynecology oncology at [facility] for further evaluation and management of the likely cervical malignancy.  I discussed that her nausea vomiting loss of appetite unintentional weight loss are likely secondary to underlying malignancy  11/28/2024 CT CAP: moderate to large right hydronephrosis and right hydroureter up to the level of cervical mass suggesting at least partial obstructive effect. No distant mets.     *Leukocytosis, likely reactive.  *Cough, new onset  Management per primary team     *Normocytic anemia, likely from vaginal bleeding  11/28/2024 hemoglobin 9.6, MCV 88.1. Lab eval suggestive of mild iron deficiency.   B12 505, folate 14, iron sats 6%, ferritin 679  11/29/2024 hemoglobin 8.8 from 9.6.    *SANKET  *Right hydronephrosis and right hydroureter  11/28/2024 CT abdomen pelvis-moderate to large right hydronephrosis and right hydroureter up to the level of cervical mass suggesting at least partial obstructive effect. Urology consult recommended       RECOMMENDATIONS/PLAN:   CT chest abdomen pelvis report reviewed with the patient  Start PO iron every other day with vit C 30 min prior to promote absorption  Urology has been consulted by primary team for eval and mgmt of right hydroureter and right hydronephrosis  Referral to gynecology oncology, Dr. Gertrudis Moreno at [place] placed. Staff message to office sent.  Nothing  else to add from our standpoint. We will sign off. Please call with questions         Daphne Yi MD

## 2024-11-29 NOTE — CONSULTS
"Nutrition Services    Patient Name:  Ambreen Espinoza  YOB: 1955  MRN: [id number]  Admit Date:  11/27/2024    Assessment Date:  11/29/24    NUTRITION SCREENING      Reason for Encounter Admission assessment   Diagnosis/Problem SOB. Hx of HLD. Found to have cervical mass. Underwent cystoscopy with retrograde pyelogram and stent insertion today.        PO Diet Diet: Cardiac; Healthy Heart (2-3 Na+); Fluid Consistency: Thin (IDDSI 0)   Supplements    PO Intake % 25-75% of recorded meals       Medications MAR reviewed by RD   Labs  Listed below, reviewed   Physical Findings No indications for NFPE   GI Function Last BM 11/25   Skin Status Intact       Height  Weight  BMI  Weight Trend     Height: 165 cm (64.96\")  Weight: 71.9 kg (158 lb 8.2 oz) (11/29/24 0509)  Body mass index is 26.41 kg/m².  Unknown       Nutrition Problem (PES) Nutrition appropriate for condition at this time as evidence by PO intakes 25-75%.       Intervention/Plan RD to follow up per protocol.     Results from last 7 days   Lab Units 11/29/24 0544 11/28/24 1934 11/28/24 0354   SODIUM mmol/L 129*  --  127*   POTASSIUM mmol/L 4.5  4.5 3.5 3.2*   CHLORIDE mmol/L 96*  --  91*   CO2 mmol/L 22.4  --  25.5   BUN mg/dL 15  --  14   CREATININE mg/dL 1.14*  --  1.15*   CALCIUM mg/dL 8.2*  --  8.5*   BILIRUBIN mg/dL 0.3  --   --    ALK PHOS U/L 77  --   --    ALT (SGPT) U/L 21  --   --    AST (SGOT) U/L 30  --   --    GLUCOSE mg/dL 129*  --  118*     Results from last 7 days   Lab Units 11/29/24 0544 11/28/24  0354   MAGNESIUM mg/dL 2.2 2.2   PHOSPHORUS mg/dL 1.6*  --    HEMOGLOBIN g/dL 8.8* 9.6*   HEMATOCRIT % 24.7* 27.4*     Lab Results   Component Value Date    HGBA1C 5.8 (H) 03/13/2024         Electronically signed by:  Sin Long RDN, NASIM  11/29/24 14:39 EST    "

## 2024-11-29 NOTE — OP NOTE
Operative Note  11/29/2024    Ambreen Espinoza  69 y.o.  1955  female  [id number]    Surgeon(s) and Role:  Marcelo Soto MD - Primary     Pre-operative Diagnosis:   1. Right ureteral obstruction  2. Right flank pain  3. Right hydroureteronephrosis    Post-operative Diagnosis: Same    Procedure Performed:  1. Rigid cystoscopy  2. Right retrograde pyelography  3. Right ureteral stent placement  4. Fluoroscopy with interpretation     Complications: None    Indications   Abdominal pain, right flank pain, gynecologic mass with hydroureteronephrosis.  She was becoming more obtunded and the urinary system appeared quite distended and with density that was concerning for bleeding, so we elected to take her for ureteral stent to decompress with the end goal being diagnostic ureteral evaluation.    We discussed with the patient and her daughter the benefits/risks/expectations and the patient desires surgical intervention. Risks include bleeding, infection, urinary tract infection/sepsis, retained stone fragments, damage to adjacent tissues including the genitalia, chronic pain, numbness, incontinence, stroke, heart attack, death, and need for additional procedures.       Findings   - Urethra patent  - Bladder:  no urothelial lesions  - Ureteral orifices orthotopic   - Right Retrograde Pyelogram: severe hydronephrosis, no obvious filling defects, no extravasation.   - Right ureteral stent, [id number], no string     Description of procedure:  The patient was properly identified in the preoperative holding area and taken to the operating room where general anesthesia was induced. Sequential compression devices were placed on both lower extremities. The patient was placed in the cystolithotomy position and prepped and draped in a sterile fashion with Betadine. IV antibiotics were administered prior to the start of the surgery. After ensuring that all of the required equipment was ready and available a surgical  pause was performed.     A 21 Uzbek rigid cystoscope was inserted into the bladder under direct visualization. The right ureteral orifice was visualized in the normal orthotopic position. Pancystoscopy revealed no bladder tumors, masses, or lesions.    Right Retrograde Pyelogram:  A 5 Fr ureteral catheter was advanced into the right distal ureter and a right retrograde pyelogram was performed showing severe hydronephrosis, no obvious filling defects, no extravasation. Cytology was obtained from the hydronephrotic drip.    The ureteral catheter was exchanged for a Sensor wire and over this a 7Fr x 26 cm right ureteral stent was advanced into the right collecting system.  Fluoro demonstrated good proximal and distal curls. There was brisk drainage of contrast from the right ureteral orifice following stent placement. Urine culture was sent from this drainage.    After shooting the retrograde the patient developed a temperature up to 40 celsius. D/t concern for potential unrecognized infection, we placed a 16 Fr alvarez catheter in the standard fashion and inflated the ballon with 10 cc sterile water.     There were no apparent complications. The patient woke up in the operating room and was taken to the recovery room in stable condition.     Specimens: None    Estimated Blood Loss: Minimal      Plan   - Transfer to PACU, then acute care bed  - Antibiotics: for 7 days  - Return to clinic with in 2 weeks with Dr. Soto ([facility]) for cytology review and discuss next steps - schedulers messaged   - Anticipate discharge when pain controlled and vitals stable from possible infection

## 2024-11-29 NOTE — PROGRESS NOTES
Right stent placed, alvarez placed  Pt became febrile to 104 during procedure  Initiate IV empiric abx  Urine culture and cytology sent  Monitor vitals closely, may need ICU if there is a trapped infection that we drained

## 2024-11-29 NOTE — PROGRESS NOTES
[facility] Clinical Pharmacy Services: Piperacillin-Tazobactam Consult    Pt Name: Ambreen Espinoza   : 1955    Indication: Urinary Tract Infection    Relevant clinical data and objective history reviewed:    History reviewed. No pertinent past medical history.  Creatinine   Date Value Ref Range Status   2024 1.14 (H) 0.57 - 1.00 mg/dL Final   2024 1.15 (H) 0.57 - 1.00 mg/dL Final   2023 0.93 0.55 - 1.02 mg/dL Final   2022 0.78 0.55 - 1.02 mg/dL Final   03/15/2022 0.86 0.55 - 1.02 mg/dL Final   2020 0.86 0.50 - 0.99 mg/dL Final     Comment:       Result Comment:   For patients >49 years of age, the reference limit  for Creatinine is approximately 13% higher for people  identified as -American.     BUN   Date Value Ref Range Status   2024 15 8 - 23 mg/dL Final   2023 14 10 - 20 mg/dL Final     Estimated Creatinine Clearance: 45.6 mL/min (A) (by C-G formula based on SCr of 1.14 mg/dL (H)).    Lab Results   Component Value Date    WBC 12.90 (H) 2024     Temp Readings from Last 3 Encounters:   24 100.2 °F (37.9 °C) (Oral)   24 97.8 °F (36.6 °C) (Tympanic)      Assessment/Plan  Estimated CrCl >20 mL/min at this time; BMI 26.41 kg/m2  Will start piperacillin-tazobactam 3.375 g IV every 8 hours     Pharmacy will continue to follow daily while on piperacillin-tazobactam and adjust as needed. Thank you for this consult.    Fransisco Vuong, PharmD  Clinical Pharmacist

## 2024-11-29 NOTE — CONSULTS
FIRST UROLOGY CONSULT      Patient Identification:  NAME:  Ambreen Espinoza  Age:  69 y.o.   Sex:  female   :  1955   MRN:  [id number]     Chief complaint: right hydronephrosis, right hydroureter, right renal lesion    History of present illness:      Pt is a 69 y.o. female with a history of pelvic pain and postmenopausal vaginal bleeding who is status post endometrial biopsy on 2024, and presented to the ER at [facility] on 2024 with progressive shortness of air with exertion. Patient has history of smoking but quit some years ago. Apparently, she works as an RN.   CT imaging performed yesterday ruled out pulmonary embolism. There was moderate to large right hydronephrosis and hydroureter up to the level of a cervical mass suggesting at least partial obstructive effect on the urinary collecting system. Additionally, there is an indeterminate right renal lesion.   Urology consulted for evaluation and management of hydroureteronephrosis and renal lesion.     Pt denies gross hematuria, fever, nausea, vomiting, flank pain.     In hospital:  -AVSS, good UOP  -WBC - 12.90 (11.81)  -Creat - 1.14 (1.15)  -UA - N/A  -UCx - N/A    -CT with contrast - 2024  Moderate to large right hydronephrosis and right hydroureter up to  the level of the cervical mass suggesting at least partial obstructive  effect on the right urinary collecting system, urology consultation  advised. Indeterminate right renal lesion, as described.  Colonic diverticulosis. No acute inflammatory process of bowel is  identified.      Past medical history:  History reviewed. No pertinent past medical history.    Past surgical history:  History reviewed. No pertinent surgical history.    Allergies:  Patient has no known allergies.    Home medications:  Medications Prior to Admission   Medication Sig Dispense Refill Last Dose/Taking    acetaminophen (TYLENOL) 500 MG tablet Take 1 tablet by mouth Every 4 (Four) Hours As Needed  for Mild Pain.   11/27/2024    atorvastatin (LIPITOR) 80 MG tablet Take 1 tablet by mouth Every Night.   11/26/2024    carBAMazepine (TEGretol) 200 MG tablet Take 1 tablet by mouth 3 (Three) Times a Day. Pt takes 2 tablets (400mg) TID   11/26/2024    ezetimibe (ZETIA) 10 MG tablet Take 1 tablet by mouth Daily. Pt takes 2 tablets (20mg) once daily   11/26/2024    hydrOXYzine pamoate (VISTARIL) 25 MG capsule Take 1 capsule by mouth 3 (Three) Times a Day As Needed for Itching. 1-2 capsule TID PRN   Taking As Needed    ibuprofen (ADVIL,MOTRIN) 600 MG tablet Take 1 tablet by mouth Every 6 (Six) Hours As Needed for Mild Pain.   11/27/2024    levothyroxine (SYNTHROID, LEVOTHROID) 50 MCG tablet Take 1 tablet by mouth Daily.   11/26/2024    meloxicam (MOBIC) 15 MG tablet Take 1 tablet by mouth Daily.   11/26/2024    ondansetron (ZOFRAN) 4 MG tablet Take 1 tablet by mouth Every 8 (Eight) Hours As Needed for Nausea or Vomiting.   11/27/2024    pregabalin (LYRICA) 50 MG capsule Take 1 capsule by mouth 2 (Two) Times a Day.   11/26/2024    traZODone (DESYREL) 50 MG tablet Take 1 tablet by mouth Every Night.   11/26/2024    vitamin D (ERGOCALCIFEROL) 1.25 MG (97634 UT) capsule capsule Take 1 capsule by mouth 1 (One) Time Per Week. Sunday   Past Week    cyclobenzaprine (FLEXERIL) 10 MG tablet Take 1 tablet by mouth 2 (Two) Times a Day As Needed for Muscle Spasms.           Hospital medications:  atorvastatin, 80 mg, Oral, Nightly  carBAMazepine, 400 mg, Oral, TID  ferrous sulfate, 325 mg, Oral, Every Other Day  levothyroxine, 50 mcg, Oral, Daily  pregabalin, 50 mg, Oral, BID  sodium phosphate, 15 mmol, Intravenous, Q3H  traZODone, 50 mg, Oral, Nightly           acetaminophen **OR** acetaminophen **OR** acetaminophen    senna-docusate sodium **AND** polyethylene glycol **AND** bisacodyl **AND** bisacodyl    cyclobenzaprine    hydrOXYzine pamoate    melatonin    ondansetron ODT **OR** ondansetron    Phosphorus Replacement - Follow  Nurse / BPA Driven Protocol    Potassium Replacement - Follow Nurse / BPA Driven Protocol    prochlorperazine    Family history:  History reviewed. No pertinent family history.    Social history:  Social History     Tobacco Use    Smoking status: Never    Smokeless tobacco: Never   Vaping Use    Vaping status: Never Used   Substance Use Topics    Alcohol use: Never    Drug use: Never       Review of systems:      12 point negative except as in HPI    Objective:  TMax 24 hours:   Temp (24hrs), Av.5 °F (36.9 °C), Min:97.5 °F (36.4 °C), Max:99.5 °F (37.5 °C)      Vitals Ranges:   Temp:  [97.5 °F (36.4 °C)-99.5 °F (37.5 °C)] 99.5 °F (37.5 °C)  Heart Rate:  [71-98] 98  Resp:  [16-18] 18  BP: (84-99)/(52-68) 99/68    Intake/Output Last 3 shifts:  I/O last 3 completed shifts:  In: 1454 [P.O.:510; I.V.:944]  Out: -      Physical Exam:    General Appearance:    Lethargic cooperative, NAD   Back:     No CVA tenderness   Lungs:     Respirations unlabored, no wheezing   Abdomen:     Soft, NDNT, no palpable bladder distension, nontender   Neuro/Psych:   Orientation intact, mood/affect pleasant       Results review:   I reviewed the patient's new clinical results.    Data review:  Lab Results (last 24 hours)       Procedure Component Value Units Date/Time    Comprehensive Metabolic Panel [[id number]]  (Abnormal) Collected: 24    Specimen: Blood Updated: 24     Glucose 129 mg/dL      BUN 15 mg/dL      Creatinine 1.14 mg/dL      Sodium 129 mmol/L      Potassium 4.5 mmol/L      Chloride 96 mmol/L      CO2 22.4 mmol/L      Calcium 8.2 mg/dL      Total Protein 6.2 g/dL      Albumin 2.9 g/dL      ALT (SGPT) 21 U/L      AST (SGOT) 30 U/L      Alkaline Phosphatase 77 U/L      Total Bilirubin 0.3 mg/dL      Globulin 3.3 gm/dL      A/G Ratio 0.9 g/dL      BUN/Creatinine Ratio 13.2     Anion Gap 10.6 mmol/L      eGFR 51.9 mL/min/1.73     Narrative:      GFR Normal >60  Chronic Kidney Disease <60  Kidney Failure  <15      Magnesium [[id number]]  (Normal) Collected: 11/29/24 0544    Specimen: Blood Updated: 11/29/24 0647     Magnesium 2.2 mg/dL     Phosphorus [[id number]]  (Abnormal) Collected: 11/29/24 0544    Specimen: Blood Updated: 11/29/24 0646     Phosphorus 1.6 mg/dL     Potassium [[id number]]  (Normal) Collected: 11/29/24 0544    Specimen: Blood Updated: 11/29/24 0641     Potassium 4.5 mmol/L     CBC (No Diff) [[id number]]  (Abnormal) Collected: 11/29/24 0544    Specimen: Blood Updated: 11/29/24 0614     WBC 12.90 10*3/mm3      RBC 2.85 10*6/mm3      Hemoglobin 8.8 g/dL      Hematocrit 24.7 %      MCV 86.7 fL      MCH 30.9 pg      MCHC 35.6 g/dL      RDW 11.7 %      RDW-SD 36.4 fl      MPV 9.7 fL      Platelets 250 10*3/mm3     Potassium [[id number]]  (Normal) Collected: 11/28/24 1934    Specimen: Blood Updated: 11/28/24 2015     Potassium 3.5 mmol/L     Osmolality, Urine - Urine, Clean Catch [[id number]] Collected: 11/28/24 1221    Specimen: Urine, Clean Catch Updated: 11/28/24 1316     Osmolality, Urine 474 mOsm/kg     Narrative:      Osmo Normal Reference Ranges:    Random:  mOsm/kg H2O, depending on fluid intake.  Random: >850 mOsm/kg H20, after 12 hour fluid restriction.    24 Hour: 300-900 mOsm/kg H2O.    Sodium, Urine, Random - Urine, Clean Catch [[id number]] Collected: 11/28/24 1221    Specimen: Urine, Clean Catch Updated: 11/28/24 1301     Sodium, Urine <20 mmol/L     Narrative:      Reference intervals for random urine have not been established.  Clinical usage is dependent upon physician's interpretation in combination with other laboratory tests.       Osmolality, Serum [[id number]]  (Abnormal) Collected: 11/28/24 1023    Specimen: Blood Updated: 11/28/24 1149     Osmolality 273 mOsm/kg     Vitamin B12 [[id number]]  (Normal) Collected: 11/28/24 1023    Specimen: Blood Updated: 11/28/24 1131     Vitamin B-12 505 pg/mL     Narrative:      Results may be falsely increased if patient taking Biotin.      Folate  [[id number]]  (Normal) Collected: 11/28/24 1023    Specimen: Blood Updated: 11/28/24 1131     Folate 14.70 ng/mL     Narrative:      Results may be falsely increased if patient taking Biotin.      Ferritin [[id number]]  (Abnormal) Collected: 11/28/24 0354    Specimen: Blood Updated: 11/28/24 1033     Ferritin 679.00 ng/mL     Narrative:      Results may be falsely decreased if patient taking Biotin.      Iron Profile [[id number]]  (Abnormal) Collected: 11/28/24 0354    Specimen: Blood Updated: 11/28/24 1028     Iron 13 mcg/dL      Iron Saturation (TSAT) 6 %      Transferrin 136 mg/dL      TIBC 203 mcg/dL     Haptoglobin [[id number]]  (Abnormal) Collected: 11/28/24 0354    Specimen: Blood Updated: 11/28/24 1028     Haptoglobin 420 mg/dL     Lactate Dehydrogenase [[id number]]  (Normal) Collected: 11/28/24 0354    Specimen: Blood Updated: 11/28/24 1028      U/L     Retic With IRF & RET-He [[id number]]  (Normal) Collected: 11/28/24 0354    Specimen: Blood Updated: 11/28/24 1023     Immature Reticulocyte Fraction 10.3 %      Reticulocyte % 1.43 %      Reticulocyte Absolute 0.0436 10*6/mm3      Reticulocyte Hgb 30.8 pg     Reticulocytes [[id number]]  (Normal) Collected: 11/28/24 0354    Specimen: Blood Updated: 11/28/24 1023     Reticulocyte % 1.43 %      Reticulocyte Absolute 0.0436 10*6/mm3              Imaging:  Imaging Results (Last 24 Hours)       Procedure Component Value Units Date/Time    CT Angiogram Chest [[id number]] Collected: 11/28/24 1425     Updated: 11/28/24 1440    Narrative:      CT ANGIOGRAM CHEST-, CT ABDOMEN PELVIS W CONTRAST-     INDICATIONS: Short of breath, anemia, cervical mass, nausea, vomiting.  Radiation dose reduction techniques were utilized, including automated  exposure control and exposure modulation based on body size.     TECHNIQUE: CT ANGIOGRAPHY OF THE CHEST. THREE-DIMENSIONAL  RECONSTRUCTIONS. Enhanced CT of the abdomen, pelvis     COMPARISON: None available     FINDINGS:     Chest  CTA:     No pulmonary embolism. No aortic dissection.     The heart size is normal without pericardial effusion. Pericardial  recess fluid is seen. Small coronary arterial calcification is apparent.  A few small subcentimeter short axis mediastinal lymph nodes are seen  that are not significant by size criteria. Thyroid nodularity is  apparent, suboptimally evaluated with this technique, thyroid ultrasound  correlation advised as indicated.     The airways appear clear.     No pleural effusion or pneumothorax.     The lungs show no focal pulmonary consolidation or mass. Small  atelectasis/scarring is apparent in both lungs.                 Abdomen pelvis CT:     Hepatic and left renal low densities are seen that are too small to  characterize.     A right renal 1.2 cm lesion shows greater density than expected for  simple cyst, could be a hyperdense cyst or solid lesion, dedicated renal  MRI or ultrasound advised for further evaluation.     Moderate to large right hydronephrosis and right hydroureter to the  level of the cervix are noted suggesting obstructive process, possibly  related to stated history of cervical mass. Lobulated masslike  configuration of the cervix is noted, 4.8 cm on sagittal image 74     Otherwise unremarkable appearance of the liver, spleen, adrenal glands,  pancreas, kidneys, bladder.     No bowel obstruction or abnormal bowel thickening is identified. Colonic  diverticula are seen that do not appear inflamed.     No free intraperitoneal gas. Minimal pelvic free fluid.     Scattered small mesenteric and para-aortic lymph nodes are seen that are  not significant by size criteria.     Abdominal aorta is not aneurysmal. Aortic and other arterial  calcifications are present.     Degenerative changes are seen in the spine. No acute fracture is  identified.             Impression:            1. No pulmonary embolism.     2. Moderate to large right hydronephrosis and right hydroureter up to  the  level of the cervical mass suggesting at least partial obstructive  effect on the right urinary collecting system, urology consultation  advised. Indeterminate right renal lesion, as described.     3. Colonic diverticulosis. No acute inflammatory process of bowel is  identified.     This report was finalized on 11/28/2024 2:37 PM by Dr. Rivas Souza M.D on Workstation: [email or web address]       CT Abdomen Pelvis With Contrast [[id number]] Collected: 11/28/24 1425     Updated: 11/28/24 1440    Narrative:      CT ANGIOGRAM CHEST-, CT ABDOMEN PELVIS W CONTRAST-     INDICATIONS: Short of breath, anemia, cervical mass, nausea, vomiting.  Radiation dose reduction techniques were utilized, including automated  exposure control and exposure modulation based on body size.     TECHNIQUE: CT ANGIOGRAPHY OF THE CHEST. THREE-DIMENSIONAL  RECONSTRUCTIONS. Enhanced CT of the abdomen, pelvis     COMPARISON: None available     FINDINGS:     Chest CTA:     No pulmonary embolism. No aortic dissection.     The heart size is normal without pericardial effusion. Pericardial  recess fluid is seen. Small coronary arterial calcification is apparent.  A few small subcentimeter short axis mediastinal lymph nodes are seen  that are not significant by size criteria. Thyroid nodularity is  apparent, suboptimally evaluated with this technique, thyroid ultrasound  correlation advised as indicated.     The airways appear clear.     No pleural effusion or pneumothorax.     The lungs show no focal pulmonary consolidation or mass. Small  atelectasis/scarring is apparent in both lungs.                 Abdomen pelvis CT:     Hepatic and left renal low densities are seen that are too small to  characterize.     A right renal 1.2 cm lesion shows greater density than expected for  simple cyst, could be a hyperdense cyst or solid lesion, dedicated renal  MRI or ultrasound advised for further evaluation.     Moderate to large right hydronephrosis and right  hydroureter to the  level of the cervix are noted suggesting obstructive process, possibly  related to stated history of cervical mass. Lobulated masslike  configuration of the cervix is noted, 4.8 cm on sagittal image 74     Otherwise unremarkable appearance of the liver, spleen, adrenal glands,  pancreas, kidneys, bladder.     No bowel obstruction or abnormal bowel thickening is identified. Colonic  diverticula are seen that do not appear inflamed.     No free intraperitoneal gas. Minimal pelvic free fluid.     Scattered small mesenteric and para-aortic lymph nodes are seen that are  not significant by size criteria.     Abdominal aorta is not aneurysmal. Aortic and other arterial  calcifications are present.     Degenerative changes are seen in the spine. No acute fracture is  identified.             Impression:            1. No pulmonary embolism.     2. Moderate to large right hydronephrosis and right hydroureter up to  the level of the cervical mass suggesting at least partial obstructive  effect on the right urinary collecting system, urology consultation  advised. Indeterminate right renal lesion, as described.     3. Colonic diverticulosis. No acute inflammatory process of bowel is  identified.     This report was finalized on 11/28/2024 2:37 PM by Dr. Rivas Souza M.D on Workstation: [email or web address]       XR Chest 1 View [[id number]] Collected: 11/28/24 1306     Updated: 11/28/24 1309    Narrative:      XR CHEST 1 VW-     CLINICAL HISTORY:  sob     COMPARISON:  None     FINDINGS: A single portable view of the chest demonstrates the heart to  be within normal limits in size. There is no evidence of focal  infiltrate, effusion or congestive failure. There is mild interstitial  vascular prominence appreciated involving the parahilar regions  bilaterally.           This report was finalized on 11/28/2024 1:06 PM by Dr. Rolando Teixeira M.D on Workstation: [email or web address]                Assessment:      Hydronephrosis  Hydroureter  Renal mass    Plan:     - Plan for OR today with Dr. Soto for cystoscopy with retrograde pyelogram and stent insertion.  - Procedure was discussed in detail with patient. All questions answered to her satisfaction and she requests to proceed.  -  Continue NPO status until procedure.  -  Consent has been obtained per RN  -  Can further evaluated renal lesion once hydronephrosis improved. She will need follow-up imaging.    Shelley Hathaway, APRN  11/29/24  09:47 EST    Plan reviewed and discussed with Dr. Soto

## 2024-11-30 LAB
ALBUMIN SERPL-MCNC: 2.6 G/DL (ref 3.5–5.2)
ALBUMIN/GLOB SERPL: 0.9 G/DL
ALP SERPL-CCNC: 63 U/L (ref 39–117)
ALT SERPL W P-5'-P-CCNC: 16 U/L (ref 1–33)
ANION GAP SERPL CALCULATED.3IONS-SCNC: 10.1 MMOL/L (ref 5–15)
AST SERPL-CCNC: 26 U/L (ref 1–32)
BILIRUB SERPL-MCNC: 0.2 MG/DL (ref 0–1.2)
BUN SERPL-MCNC: 14 MG/DL (ref 8–23)
BUN/CREAT SERPL: 14.9 (ref 7–25)
CALCIUM SPEC-SCNC: 7.4 MG/DL (ref 8.6–10.5)
CHLORIDE SERPL-SCNC: 104 MMOL/L (ref 98–107)
CO2 SERPL-SCNC: 21.9 MMOL/L (ref 22–29)
CREAT SERPL-MCNC: 0.94 MG/DL (ref 0.57–1)
DEPRECATED RDW RBC AUTO: 42.5 FL (ref 37–54)
EGFRCR SERPLBLD CKD-EPI 2021: 65.4 ML/MIN/1.73
ERYTHROCYTE [DISTWIDTH] IN BLOOD BY AUTOMATED COUNT: 12.9 % (ref 12.3–15.4)
GLOBULIN UR ELPH-MCNC: 2.8 GM/DL
GLUCOSE SERPL-MCNC: 107 MG/DL (ref 65–99)
HCT VFR BLD AUTO: 22.7 % (ref 34–46.6)
HCT VFR BLD AUTO: 23.9 % (ref 34–46.6)
HGB BLD-MCNC: 7.6 G/DL (ref 12–15.9)
HGB BLD-MCNC: 8.4 G/DL (ref 12–15.9)
MAGNESIUM SERPL-MCNC: 2 MG/DL (ref 1.6–2.4)
MCH RBC QN AUTO: 30.6 PG (ref 26.6–33)
MCHC RBC AUTO-ENTMCNC: 33.5 G/DL (ref 31.5–35.7)
MCV RBC AUTO: 91.5 FL (ref 79–97)
PHOSPHATE SERPL-MCNC: 2.8 MG/DL (ref 2.5–4.5)
PLATELET # BLD AUTO: 207 10*3/MM3 (ref 140–450)
PMV BLD AUTO: 9.7 FL (ref 6–12)
POTASSIUM SERPL-SCNC: 3.8 MMOL/L (ref 3.5–5.2)
PROT SERPL-MCNC: 5.4 G/DL (ref 6–8.5)
RBC # BLD AUTO: 2.48 10*6/MM3 (ref 3.77–5.28)
SODIUM SERPL-SCNC: 136 MMOL/L (ref 136–145)
WBC NRBC COR # BLD AUTO: 11.21 10*3/MM3 (ref 3.4–10.8)

## 2024-11-30 PROCEDURE — 25010000002 PIPERACILLIN SOD-TAZOBACTAM PER 1 G: Performed by: STUDENT IN AN ORGANIZED HEALTH CARE EDUCATION/TRAINING PROGRAM

## 2024-11-30 PROCEDURE — 25810000003 SODIUM CHLORIDE 0.9 % SOLUTION 250 ML FLEX CONT: Performed by: STUDENT IN AN ORGANIZED HEALTH CARE EDUCATION/TRAINING PROGRAM

## 2024-11-30 PROCEDURE — 80053 COMPREHEN METABOLIC PANEL: CPT | Performed by: HOSPITALIST

## 2024-11-30 PROCEDURE — 84100 ASSAY OF PHOSPHORUS: CPT | Performed by: HOSPITALIST

## 2024-11-30 PROCEDURE — 85018 HEMOGLOBIN: CPT | Performed by: STUDENT IN AN ORGANIZED HEALTH CARE EDUCATION/TRAINING PROGRAM

## 2024-11-30 PROCEDURE — 25010000002 VANCOMYCIN HCL 1.25 G RECONSTITUTED SOLUTION 1 EACH VIAL: Performed by: STUDENT IN AN ORGANIZED HEALTH CARE EDUCATION/TRAINING PROGRAM

## 2024-11-30 PROCEDURE — 85014 HEMATOCRIT: CPT | Performed by: STUDENT IN AN ORGANIZED HEALTH CARE EDUCATION/TRAINING PROGRAM

## 2024-11-30 PROCEDURE — 83735 ASSAY OF MAGNESIUM: CPT | Performed by: HOSPITALIST

## 2024-11-30 PROCEDURE — 85027 COMPLETE CBC AUTOMATED: CPT | Performed by: HOSPITALIST

## 2024-11-30 RX ADMIN — PREGABALIN 50 MG: 50 CAPSULE ORAL at 09:28

## 2024-11-30 RX ADMIN — VANCOMYCIN HYDROCHLORIDE 1250 MG: 1.25 INJECTION, POWDER, LYOPHILIZED, FOR SOLUTION INTRAVENOUS at 15:28

## 2024-11-30 RX ADMIN — ATORVASTATIN CALCIUM 80 MG: 80 TABLET, FILM COATED ORAL at 20:08

## 2024-11-30 RX ADMIN — CARBAMAZEPINE 400 MG: 200 TABLET ORAL at 18:34

## 2024-11-30 RX ADMIN — PIPERACILLIN AND TAZOBACTAM 3.38 G: 3; .375 INJECTION, POWDER, FOR SOLUTION INTRAVENOUS at 09:30

## 2024-11-30 RX ADMIN — CARBAMAZEPINE 400 MG: 200 TABLET ORAL at 12:09

## 2024-11-30 RX ADMIN — TRAZODONE HYDROCHLORIDE 50 MG: 50 TABLET ORAL at 20:08

## 2024-11-30 RX ADMIN — PREGABALIN 50 MG: 50 CAPSULE ORAL at 20:08

## 2024-11-30 RX ADMIN — PIPERACILLIN AND TAZOBACTAM 3.38 G: 3; .375 INJECTION, POWDER, FOR SOLUTION INTRAVENOUS at 01:57

## 2024-11-30 RX ADMIN — CARBAMAZEPINE 400 MG: 200 TABLET ORAL at 09:28

## 2024-11-30 RX ADMIN — LEVOTHYROXINE SODIUM 50 MCG: 0.05 TABLET ORAL at 09:28

## 2024-11-30 RX ADMIN — PIPERACILLIN AND TAZOBACTAM 3.38 G: 3; .375 INJECTION, POWDER, FOR SOLUTION INTRAVENOUS at 17:29

## 2024-11-30 NOTE — PROGRESS NOTES
Name: Ambreen Espinoza ADMIT: 2024   : 1955  PCP: Lisseth Rankin APRN    MRN: [id number] LOS: 1 days   AGE/SEX: 69 y.o. female  ROOM: [facility]     Subjective   Subjective   States she feels like her head is in a bit of a fog but is AOx3       Objective   Objective   Vital Signs  Temp:  [92.4 °F (33.6 °C)-102.4 °F (39.1 °C)] 92.4 °F (33.6 °C)  Heart Rate:  [] 79  Resp:  [16-20] 20  BP: ()/(50-73) 101/64  SpO2:  [97 %-100 %] 100 %  on  Flow (L/min) (Oxygen Therapy):  [4] 4;   Device (Oxygen Therapy): room air  Body mass index is 26.41 kg/m².    (No change in exam today)    Physical Exam  Constitutional:       General: She is not in acute distress.     Appearance: She is ill-appearing.   HENT:      Head: Normocephalic and atraumatic.   Cardiovascular:      Rate and Rhythm: Normal rate and regular rhythm.   Pulmonary:      Effort: Pulmonary effort is normal. No respiratory distress.   Abdominal:      General: There is no distension.      Palpations: Abdomen is soft.      Tenderness: There is no abdominal tenderness.   Neurological:      General: No focal deficit present.      Mental Status: She is alert and oriented to person, place, and time.       Results Review     I reviewed the patient's new clinical results.  Results from last 7 days   Lab Units 24  035   WBC 10*3/mm3 11.21* 10.92* 12.90* 11.81*   HEMOGLOBIN g/dL 7.6* 7.9* 8.8* 9.6*   PLATELETS 10*3/mm3 207 174 250 274     Results from last 7 days   Lab Units 2444 24  0354   SODIUM mmol/L 136 129*  --  127*   POTASSIUM mmol/L 3.8 4.5  4.5 3.5 3.2*   CHLORIDE mmol/L 104 96*  --  91*   CO2 mmol/L 21.9* 22.4  --  25.5   BUN mg/dL 14 15  --  14   CREATININE mg/dL 0.94 1.14*  --  1.15*   GLUCOSE mg/dL 107* 129*  --  118*   EGFR mL/min/1.73 65.4 51.9*  --  51.4*     Results from last 7 days   Lab Units 24  0423 24  0544  "  ALBUMIN g/dL 2.6* 2.9*   BILIRUBIN mg/dL 0.2 0.3   ALK PHOS U/L 63 77   AST (SGOT) U/L 26 30   ALT (SGPT) U/L 16 21     Results from last 7 days   Lab Units 11/30/24  0423 11/29/24 2003 11/29/24  0544 11/28/24  0354   CALCIUM mg/dL 7.4*  --  8.2* 8.5*   ALBUMIN g/dL 2.6*  --  2.9*  --    MAGNESIUM mg/dL 2.0  --  2.2 2.2   PHOSPHORUS mg/dL 2.8 3.6 1.6*  --        No results found for: \"HGBA1C\", \"POCGLU\"    FL Retrograde Pyelogram In OR    Result Date: 11/29/2024   As described.  This report was finalized on 11/29/2024 1:23 PM by Dr. Rivas Souza M.D on Workstation: [email or web address]      CT Angiogram Chest    Result Date: 11/28/2024    1. No pulmonary embolism.  2. Moderate to large right hydronephrosis and right hydroureter up to the level of the cervical mass suggesting at least partial obstructive effect on the right urinary collecting system, urology consultation advised. Indeterminate right renal lesion, as described.  3. Colonic diverticulosis. No acute inflammatory process of bowel is identified.  This report was finalized on 11/28/2024 2:37 PM by Dr. Rivas Souza M.D on Workstation: [email or web address]      CT Abdomen Pelvis With Contrast    Result Date: 11/28/2024    1. No pulmonary embolism.  2. Moderate to large right hydronephrosis and right hydroureter up to the level of the cervical mass suggesting at least partial obstructive effect on the right urinary collecting system, urology consultation advised. Indeterminate right renal lesion, as described.  3. Colonic diverticulosis. No acute inflammatory process of bowel is identified.  This report was finalized on 11/28/2024 2:37 PM by Dr. Rivas Souza M.D on Workstation: [email or web address]       I have personally reviewed all medications:  Scheduled Medications  atorvastatin, 80 mg, Oral, Nightly  carBAMazepine, 400 mg, Oral, TID  ferrous sulfate, 325 mg, Oral, Every Other Day  levothyroxine, 50 mcg, Oral, Daily  piperacillin-tazobactam, 3.375 g, Intravenous, " Q8H  pregabalin, 50 mg, Oral, BID  traZODone, 50 mg, Oral, Nightly  vancomycin, 1,250 mg, Intravenous, Q24H    Infusions  Pharmacy to dose vancomycin,     Diet  Diet: Cardiac; Healthy Heart (2-3 Na+); Fluid Consistency: Thin (IDDSI 0)           Assessment/Plan     Active Hospital Problems    Diagnosis  POA    **Dyspnea [R06.00]  Yes    HLD (hyperlipidemia) [E78.5]  Yes    Hypothyroidism (acquired) [E03.9]  Yes    Trigeminal neuralgia [G50.0]  Yes    Abnormal vaginal bleeding [N93.9]  Yes    Vitamin D deficiency [E55.9]  Yes    Chronic pain syndrome [G89.4]  Yes    Chronic back pain [M54.9, G89.29]  Yes    Hypokalemia [E87.6]  Yes    Hyponatremia [E87.1]  Yes    Anemia [D64.9]  Yes    Mass of uterine cervix determined by ultrasound [N88.8]  Yes      Resolved Hospital Problems   No resolved problems to display.       68yo woman with hypoT4, HLD, chronic low back pain, and h/o trigeminal neuralgia, who was sent to [facility] ER from Gyn office with c/o one month of HOLLOWAY, fatigue, N/V, alteration in taste, weight loss, and intermittent vaginal bleeding. Pelvic US revealed hypervascular cervical mass that was biopsied. In [facility] ER she was found to be anemic, hyponatremic, and hypokalemic. BNP was elevated. She was transferred to [facility] for admission and further workup.    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 Moreno)  Heme/Onc consulted here  CT Chest okay  CT A/P showed obstruction of right urinary collecting system due to cervical mass as well as possible solid right renal lesion (see below)  Continue PRN meds for nausea  Oral iron with Vit C ordered    Right hydronephrosis  Right renal lesion  Fever   consulted  Cysto today with right ureteral stent and alvarez placement  T104 during procedure   started Zosyn/Vanc and sent urine for culture and cytology- UCX with gram negatives   I have ordered blood cultures  Will  need further imaging of right renal lesion when hydronephrosis resolved and Cr improved    HOLLOWAY  CTA neg for PE  Echo reassuring  Card has signed off    Hyponatremia  Urine studies not c/w SIADH  Cr up, BPs low--suspect she is volume depleted and some component of obstruction (see above)  Na+ up somewhat this AM after IV NS  Continue IV NS and monitor Cr and Na+ in AM    Hypokalemia  Replaced with procotol  Mg++ level fine    HypoT4  TSH wnl  Continue L-T4    Chronic low back pain  Continue Lyrica and PRN Flexeril    Trigeminal neuralgia  Continue Tegretol      SCDs for DVT prophylaxis.  Full code.  Discussed with patient and RN.  Anticipate discharge home with family, timing yet to be determined.  Expected discharge date/ time has not been documented.      Yaakov Babcock MD  [facility]  11/30/24  12:19 EST

## 2024-11-30 NOTE — PLAN OF CARE
Goal Outcome Evaluation:  Plan of Care Reviewed With: patient                  Outcome Evaluation: A&Ox4. S/P from cysto with stent placement. Afebrile throughout shift. No c/o pain. SBP initially in the low to mid 80s. LHA notified. Ordered 500ml bolusx1. SBP recovered to low 100s. Patient remained asymptomatic. RA. SR on the monitor. IV abx infusing per order. F/C remains. Cath care completed this shift. All questions answered with verbalized understanding. Care on going. Bed alarm in place. Personal belongings within reach.                      Problem: Adult Inpatient Plan of Care  Goal: Plan of Care Review  11/30/2024 0505 by Yamilet Houser, RN  Flowsheets  Taken 11/30/2024 0505  Progress: improving  Taken 11/30/2024 0502  Outcome Evaluation: A&Ox4. S/P from cysto with stent placement. Afebrile throughout shift. No c/o pain. SBP initially in the low to mid 80s. LHA notified. Ordered 500ml bolusx1. SBP recovered to low 100s. Patient remained asymptomatic. RA. SR on the monitor. IV abx infusing per order. F/C remains. Cath care completed this shift. All questions answered with verbalized understanding. Care on going. Bed alarm in place. Personal belongings within reach.  11/30/2024 0502 by Yamilet Houser, RN  Flowsheets (Taken 11/30/2024 0502)  Outcome Evaluation: A&Ox4. S/P from cysto with stent placement. Afebrile throughout shift. No c/o pain. SBP initially in the low to mid 80s. LHA notified. Ordered 500ml bolusx1. SBP recovered to low 100s. Patient remained asymptomatic. RA. SR on the monitor. IV abx infusing per order. F/C remains. Cath care completed this shift. All questions answered with verbalized understanding. Care on going. Bed alarm in place. Personal belongings within reach.  Plan of Care Reviewed With: patient

## 2024-11-30 NOTE — PROGRESS NOTES
POD 1 right stent for hydro     Right hydro   -S/p right stent placement 11/29 by Dr. Soto for ureteral obstruction and flank pain   -Cysto/ RGP showed no lesions, severe hydro withOUT extravasion or filling defects  -Spiked fever during surgery     -No fever since but hypotension and tachycardia  -Pt states she is normally hypotensive     -She feels better and has no pain   -She wants the alvarez removed  - good UOP     Labs:  Wbc 11.21  H/H- 7.6/22.7 (7.9/22.9, 8.8/34.7, 9.6/27.4)    Plan:  Keep alvarez until this evening unless becomes febrile again   Nursing aware to removed at end of day shift   Will continue to follow

## 2024-11-30 NOTE — SIGNIFICANT NOTE
11/30/24 1041   OTHER   Discipline physical therapist   Rehab Time/Intention   Session Not Performed other (see comments)  (Pt feeeling lightheaded, requesting to see RN and have BP taken. RN notified.Will follow up later today as time allows)   Recommendation   PT - Next Appointment 12/01/24

## 2024-12-01 LAB
ANION GAP SERPL CALCULATED.3IONS-SCNC: 9 MMOL/L (ref 5–15)
BACTERIA SPEC AEROBE CULT: ABNORMAL
BUN SERPL-MCNC: 12 MG/DL (ref 8–23)
BUN/CREAT SERPL: 13.8 (ref 7–25)
CALCIUM SPEC-SCNC: 8 MG/DL (ref 8.6–10.5)
CHLORIDE SERPL-SCNC: 103 MMOL/L (ref 98–107)
CO2 SERPL-SCNC: 23 MMOL/L (ref 22–29)
CREAT SERPL-MCNC: 0.87 MG/DL (ref 0.57–1)
DEPRECATED RDW RBC AUTO: 39.1 FL (ref 37–54)
EGFRCR SERPLBLD CKD-EPI 2021: 71.8 ML/MIN/1.73
ERYTHROCYTE [DISTWIDTH] IN BLOOD BY AUTOMATED COUNT: 12.2 % (ref 12.3–15.4)
GLUCOSE SERPL-MCNC: 124 MG/DL (ref 65–99)
HCT VFR BLD AUTO: 23.8 % (ref 34–46.6)
HGB BLD-MCNC: 8.3 G/DL (ref 12–15.9)
MCH RBC QN AUTO: 30.7 PG (ref 26.6–33)
MCHC RBC AUTO-ENTMCNC: 34.9 G/DL (ref 31.5–35.7)
MCV RBC AUTO: 88.1 FL (ref 79–97)
PLATELET # BLD AUTO: 268 10*3/MM3 (ref 140–450)
PMV BLD AUTO: 9.5 FL (ref 6–12)
POTASSIUM SERPL-SCNC: 4.1 MMOL/L (ref 3.5–5.2)
RBC # BLD AUTO: 2.7 10*6/MM3 (ref 3.77–5.28)
SODIUM SERPL-SCNC: 135 MMOL/L (ref 136–145)
WBC NRBC COR # BLD AUTO: 9 10*3/MM3 (ref 3.4–10.8)

## 2024-12-01 PROCEDURE — 85027 COMPLETE CBC AUTOMATED: CPT | Performed by: STUDENT IN AN ORGANIZED HEALTH CARE EDUCATION/TRAINING PROGRAM

## 2024-12-01 PROCEDURE — 25810000003 SODIUM CHLORIDE 0.9 % SOLUTION 250 ML FLEX CONT: Performed by: STUDENT IN AN ORGANIZED HEALTH CARE EDUCATION/TRAINING PROGRAM

## 2024-12-01 PROCEDURE — 80048 BASIC METABOLIC PNL TOTAL CA: CPT | Performed by: STUDENT IN AN ORGANIZED HEALTH CARE EDUCATION/TRAINING PROGRAM

## 2024-12-01 PROCEDURE — 25010000002 PIPERACILLIN SOD-TAZOBACTAM PER 1 G: Performed by: STUDENT IN AN ORGANIZED HEALTH CARE EDUCATION/TRAINING PROGRAM

## 2024-12-01 PROCEDURE — 97162 PT EVAL MOD COMPLEX 30 MIN: CPT | Performed by: PHYSICAL THERAPIST

## 2024-12-01 PROCEDURE — 25010000002 VANCOMYCIN HCL 1.25 G RECONSTITUTED SOLUTION 1 EACH VIAL: Performed by: STUDENT IN AN ORGANIZED HEALTH CARE EDUCATION/TRAINING PROGRAM

## 2024-12-01 RX ORDER — LORAZEPAM 1 MG/1
1 TABLET ORAL ONCE AS NEEDED
Status: DISCONTINUED | OUTPATIENT
Start: 2024-12-01 | End: 2024-12-03 | Stop reason: HOSPADM

## 2024-12-01 RX ADMIN — LEVOTHYROXINE SODIUM 50 MCG: 0.05 TABLET ORAL at 08:29

## 2024-12-01 RX ADMIN — PIPERACILLIN AND TAZOBACTAM 3.38 G: 3; .375 INJECTION, POWDER, FOR SOLUTION INTRAVENOUS at 02:14

## 2024-12-01 RX ADMIN — VANCOMYCIN HYDROCHLORIDE 1250 MG: 1.25 INJECTION, POWDER, LYOPHILIZED, FOR SOLUTION INTRAVENOUS at 15:32

## 2024-12-01 RX ADMIN — ATORVASTATIN CALCIUM 80 MG: 80 TABLET, FILM COATED ORAL at 20:10

## 2024-12-01 RX ADMIN — FERROUS SULFATE TAB 325 MG (65 MG ELEMENTAL FE) 325 MG: 325 (65 FE) TAB at 08:29

## 2024-12-01 RX ADMIN — PREGABALIN 50 MG: 50 CAPSULE ORAL at 08:29

## 2024-12-01 RX ADMIN — ACETAMINOPHEN 650 MG: 325 TABLET ORAL at 20:29

## 2024-12-01 RX ADMIN — PIPERACILLIN AND TAZOBACTAM 3.38 G: 3; .375 INJECTION, POWDER, FOR SOLUTION INTRAVENOUS at 18:36

## 2024-12-01 RX ADMIN — CARBAMAZEPINE 400 MG: 200 TABLET ORAL at 08:29

## 2024-12-01 RX ADMIN — CARBAMAZEPINE 400 MG: 200 TABLET ORAL at 18:36

## 2024-12-01 RX ADMIN — TRAZODONE HYDROCHLORIDE 50 MG: 50 TABLET ORAL at 20:10

## 2024-12-01 RX ADMIN — PIPERACILLIN AND TAZOBACTAM 3.38 G: 3; .375 INJECTION, POWDER, FOR SOLUTION INTRAVENOUS at 10:05

## 2024-12-01 RX ADMIN — CARBAMAZEPINE 400 MG: 200 TABLET ORAL at 12:14

## 2024-12-01 RX ADMIN — PREGABALIN 50 MG: 50 CAPSULE ORAL at 20:10

## 2024-12-01 NOTE — PLAN OF CARE
Goal Outcome Evaluation:  Plan of Care Reviewed With: patient        Progress: no change         Outcome Evaluation: A&Ox4. VSS. Afebrile. SOB with exertion. SR-ST on the monitor. H&H checked for increased WOB and tachycardia. H&H stable. Patient stated they were frustrated with situation that made her SOB. No difficulties voiding. Developed bleeding with voiding again. Initially changing pads q3O mins but has minimized to every 4 hrs. No c/o pain. IV ABX currently infusing per order. All questions answered with verbalized understanding. Care on going.

## 2024-12-01 NOTE — THERAPY DISCHARGE NOTE
Patient Name: Ambreen Espinoza  : 1955    MRN: [id number]                              Today's Date: 2024       Admit Date: 2024    Visit Dx:     ICD-10-CM ICD-9-CM   1. Mass of uterine cervix determined by ultrasound  N88.8 622.8   2. Hydronephrosis  N13.30 591     Patient Active Problem List   Diagnosis    Dyspnea    HLD (hyperlipidemia)    Hypothyroidism (acquired)    Trigeminal neuralgia    Abnormal vaginal bleeding    Vitamin D deficiency    Chronic pain syndrome    Chronic back pain    Hypokalemia    Hyponatremia    Anemia    Mass of uterine cervix determined by ultrasound     History reviewed. No pertinent past medical history.  History reviewed. No pertinent surgical history.   General Information       [facility] Name 24 [facility]          Physical Therapy Time and Intention    Document Type evaluation;discharge evaluation/summary  -     Mode of Treatment individual therapy;physical therapy  -[facility] Name 24 1430          General Information    Patient Profile Reviewed yes  -     Prior Level of Function independent:  -     Existing Precautions/Restrictions no known precautions/restrictions  -     Barriers to Rehab none identified  -[facility] Name 24 1430          Living Environment    People in Home spouse  -[facility] Name 24 143          Home Main Entrance    Number of Stairs, Main Entrance three  -     Stair Railings, Main Entrance railings safe and in good condition  -[facility] Name 24 1430          Cognition    Orientation Status (Cognition) oriented x 4  -               User Key  (r) = Recorded By, (t) = Taken By, (c) = Cosigned By      Initials Name Provider Type     Cecilia Young, PT Physical Therapist                   Mobility       Row Name 24 1430          Bed Mobility    Bed Mobility supine-sit;sit-supine  -     Supine-Sit [place] (Bed Mobility) modified independence  -     Sit-Supine [place]  (Bed Mobility) modified independence  -     Assistive Device (Bed Mobility) head of bed elevated  -[facility] Name 12/01/24 1430          Sit-Stand Transfer    Sit-Stand [place] (Transfers) standby assist  -[facility] Name 12/01/24 1430          Gait/Stairs (Locomotion)    [place] Level (Gait) standby assist;contact guard  -KH     Distance in Feet (Gait) 150  -KH     Deviations/Abnormal Patterns (Gait) gait speed decreased  -KH     [place] Level (Stairs) contact guard  -KH     Handrail Location (Stairs) right side (ascending)  -KH     Number of Steps (Stairs) 4  -KH     Ascending Technique (Stairs) step-over-step  -KH     Descending Technique (Stairs) step-to-step  -KH               User Key  (r) = Recorded By, (t) = Taken By, (c) = Cosigned By      Initials Name Provider Type    Cecilia Weber, NEETA Physical Therapist                   Obj/Interventions       Row Name 12/01/24 1431          Range of Motion Comprehensive    Comment, General Range of Motion WFL  -[facility] Name 12/01/24 1431          Strength Comprehensive (MMT)    Comment, General Manual Muscle Testing (MMT) Assessment [facility]  -[facility] Name 12/01/24 1431          Balance    Balance Assessment sitting static balance;sitting dynamic balance;standing static balance;standing dynamic balance  -     Static Sitting Balance independent  -     Dynamic Sitting Balance independent  -     Position, Sitting Balance unsupported;sitting edge of bed  -     Static Standing Balance standby assist  -     Dynamic Standing Balance contact guard  -               User Key  (r) = Recorded By, (t) = Taken By, (c) = Cosigned By      Initials Name Provider Type    Cecilia Weber, NEETA Physical Therapist                   Goals/Plan    No documentation.                  Clinical Impression       [facility] Name 12/01/24 1431          Pain    Pretreatment Pain Rating 0/10 - no pain  -     Posttreatment Pain Rating 0/10 - no  pain  -       Row Name 12/01/24 1431          Plan of Care Review    Plan of Care Reviewed With patient  -     Outcome Evaluation Patient presented to the hospital with shortness of breath and was s/p endometrial biopsy on the day of admission.  Patient is now s/p cystoscopy.  Patient reports she is independently mobile at baseline.  She has recently been using a cane at times as needed.  Patient demonstrated independent bed mobility.  She was able to stand with SBA.  She ambulated SBA-CGA with no assistive device x 150 feet.  Few lateral path deviations noted but no overt loss of balance.  Patient was able to safely up and down 4 stairs with use of railing.  Patient appears to be at or near baseline mobility.  She would benefit from increased activity/mobilization while admitted.  Encourage patient to be up to chair for meals, walking to the bathroom and ambulating with staff or spouse multiple times per day while admitted.  Patient is safe to DC home when medically stable and PT will sign off.  -       Row Name 12/01/24 1431          Therapy Assessment/Plan (PT)    Patient/Family Therapy Goals Statement (PT) Return home to prior level of function  -     Criteria for Skilled Interventions Met (PT) no problems identified which require skilled intervention  -     Therapy Frequency (PT) evaluation only  -       Row Name 12/01/24 1431          Positioning and Restraints    Pre-Treatment Position in bed  -     Post Treatment Position bed  -     In Bed fowlers;call light within reach;encouraged to call for assist;exit alarm on;notified [facility]  -               User Key  (r) = Recorded By, (t) = Taken By, (c) = Cosigned By      Initials Name Provider Type    Cecilia Weber, PT Physical Therapist                   Outcome Measures       Row Name 12/01/24 1435 12/01/24 0706       How much help from another person do you currently need...    Turning from your back to your side while in flat bed  without using bedrails? 4  -KH 3  -EM    Moving from lying on back to sitting on the side of a flat bed without bedrails? 4  -KH 3  -EM    Moving to and from a bed to a chair (including a wheelchair)? 4  -KH 3  -EM    Standing up from a chair using your arms (e.g., wheelchair, bedside chair)? 4  -KH 3  -EM    Climbing 3-5 steps with a railing? 3  -KH 3  -EM    To walk in hospital room? 3  -KH 3  -EM    AM-PAC 6 Clicks Score (PT) 22  -KH 18  -EM              User Key  (r) = Recorded By, (t) = Taken By, (c) = Cosigned By      Initials Name Provider Type    Cecilia Weber, PT Physical Therapist    Raisa Allen RN Registered Nurse                  Physical Therapy Education        No education to display                  PT Recommendation and Plan     Outcome Evaluation: Patient presented to the hospital with shortness of breath and was s/p endometrial biopsy on the day of admission.  Patient is now s/p cystoscopy.  Patient reports she is independently mobile at baseline.  She has recently been using a cane at times as needed.  Patient demonstrated independent bed mobility.  She was able to stand with SBA.  She ambulated SBA-CGA with no assistive device x 150 feet.  Few lateral path deviations noted but no overt loss of balance.  Patient was able to safely up and down 4 stairs with use of railing.  Patient appears to be at or near baseline mobility.  She would benefit from increased activity/mobilization while admitted.  Encourage patient to be up to chair for meals, walking to the bathroom and ambulating with staff or spouse multiple times per day while admitted.  Patient is safe to DC home when medically stable and PT will sign off.     Time Calculation:         PT Charges       Row Name 12/01/24 1436             Time Calculation    Start Time 0945  -KH      Stop Time 1002  -KH      Time Calculation (min) 17 min  -KH      PT Received On 12/01/24  -                User Key  (r) = Recorded By, (t)  = Taken By, (c) = Cosigned By      Initials Name Provider Type    Cecilia Weber, PT Physical Therapist                  Therapy Charges for Today       Code Description Service Date Service Provider Modifiers Qty    87341039449 HC PT EVAL MOD COMPLEXITY 2 12/1/2024 Cecilia Young, PT GP 1            PT G-Codes  AM-PAC 6 Clicks Score (PT): 22    PT Discharge Summary  Anticipated Discharge Disposition (PT): home with assist    Cecilia Young, PT  12/1/2024

## 2024-12-01 NOTE — PROGRESS NOTES
First Urology Note  12/1/2024  09:01 EST      POD 2 right ureteral stent     Pt sitting in bed comfortably     Right hydro  -Secondary to GYN mass?   -S/P right stent  -All pain resolved   -Tolerating stent well     -Spiked fever during stent placement   -Afebrile since  -All vitals improved   -On Zosyn   -Dowd removed   -Voiding well     Labs:  Wbc- 9.00(11.21)   H/h- 8.3/23.8 (8.4/23.9,7.6,22.7)   Cr- 0.87 (0.94, 1.14)      Plan:  Discussed expectations with stent   Will sign off and she will f/u with Dr. Soto, office to arrange       LIZ Mayer  12/01/24  09:01 EST

## 2024-12-01 NOTE — PLAN OF CARE
Goal Outcome Evaluation:  Plan of Care Reviewed With: patient        Progress: no change  Outcome Evaluation: no significant changes, needs MRI to evaluate right renal lesion, no bleeding today, IV abx, worked with PT, vss, will continue to monitor

## 2024-12-01 NOTE — PLAN OF CARE
Goal Outcome Evaluation:  Plan of Care Reviewed With: patient           Outcome Evaluation: Patient presented to the hospital with shortness of breath and was s/p endometrial biopsy on the day of admission.  Patient is now s/p cystoscopy.  Patient reports she is independently mobile at baseline.  She has recently been using a cane at times as needed.  Patient demonstrated independent bed mobility.  She was able to stand with SBA.  She ambulated SBA-CGA with no assistive device x 150 feet.  Few lateral path deviations noted but no overt loss of balance.  Patient was able to safely up and down 4 stairs with use of railing.  Patient appears to be at or near baseline mobility.  She would benefit from increased activity/mobilization while admitted.  Encourage patient to be up to chair for meals, walking to the bathroom and ambulating with staff or spouse multiple times per day while admitted.  Patient is safe to DC home when medically stable and PT will sign off.    Anticipated Discharge Disposition (PT): home with assist

## 2024-12-01 NOTE — PROGRESS NOTES
Name: Ambreen Espinoza ADMIT: 2024   : 1955  PCP: Lisseth Rankin APRN    MRN: [id number] LOS: 2 days   AGE/SEX: 69 y.o. female  ROOM: [facility]     Subjective   Subjective   Examined at bedside. Was walking with therapy around the hospital floor        Objective   Objective   Vital Signs  Temp:  [97.7 °F (36.5 °C)-99.3 °F (37.4 °C)] 98.6 °F (37 °C)  Heart Rate:  [83-93] 88  Resp:  [18-20] 18  BP: (109-122)/(63-70) 119/63  SpO2:  [99 %-100 %] 100 %  on   ;   Device (Oxygen Therapy): room air  Body mass index is 27.82 kg/m².      Physical Exam  Constitutional:       General: She is not in acute distress.     Appearance: She is ill-appearing.   HENT:      Head: Normocephalic and atraumatic.   Eyes:      Extraocular Movements: Extraocular movements intact.      Pupils: Pupils are equal, round, and reactive to light.   Cardiovascular:      Rate and Rhythm: Normal rate and regular rhythm.   Pulmonary:      Effort: Pulmonary effort is normal. No respiratory distress.   Abdominal:      General: There is no distension.      Palpations: Abdomen is soft.      Tenderness: There is no abdominal tenderness.   Neurological:      General: No focal deficit present.      Mental Status: She is alert and oriented to person, place, and time.       Results Review     I reviewed the patient's new clinical results.  Results from last 7 days   Lab Units 24  0544   WBC 10*3/mm3 9.00  --  11.21* 10.92* 12.90*   HEMOGLOBIN g/dL 8.3* 8.4* 7.6* 7.9* 8.8*   PLATELETS 10*3/mm3 268  --  207 174 250     Results from last 7 days   Lab Units 24  0544 24  19324  0354   SODIUM mmol/L 135* 136 129*  --  127*   POTASSIUM mmol/L 4.1 3.8 4.5  4.5 3.5 3.2*   CHLORIDE mmol/L 103 104 96*  --  91*   CO2 mmol/L 23.0 21.9* 22.4  --  25.5   BUN mg/dL 12 14 15  --  14   CREATININE mg/dL 0.87 0.94 1.14*  --  1.15*   GLUCOSE  "mg/dL 124* 107* 129*  --  118*   EGFR mL/min/1.73 71.8 65.4 51.9*  --  51.4*     Results from last 7 days   Lab Units 11/30/24 0423 11/29/24  0544   ALBUMIN g/dL 2.6* 2.9*   BILIRUBIN mg/dL 0.2 0.3   ALK PHOS U/L 63 77   AST (SGOT) U/L 26 30   ALT (SGPT) U/L 16 21     Results from last 7 days   Lab Units 12/01/24 0420 11/30/24 0423 11/29/24 2003 11/29/24  0544 11/28/24  0354   CALCIUM mg/dL 8.0* 7.4*  --  8.2* 8.5*   ALBUMIN g/dL  --  2.6*  --  2.9*  --    MAGNESIUM mg/dL  --  2.0  --  2.2 2.2   PHOSPHORUS mg/dL  --  2.8 3.6 1.6*  --        No results found for: \"HGBA1C\", \"POCGLU\"    FL Retrograde Pyelogram In OR    Result Date: 11/29/2024   As described.  This report was finalized on 11/29/2024 1:23 PM by Dr. Rivas Souza M.D on Workstation: [facility]       I have personally reviewed all medications:  Scheduled Medications  atorvastatin, 80 mg, Oral, Nightly  carBAMazepine, 400 mg, Oral, TID  ferrous sulfate, 325 mg, Oral, Every Other Day  levothyroxine, 50 mcg, Oral, Daily  piperacillin-tazobactam, 3.375 g, Intravenous, Q8H  pregabalin, 50 mg, Oral, BID  traZODone, 50 mg, Oral, Nightly  vancomycin, 1,250 mg, Intravenous, Q24H    Infusions  Pharmacy to dose vancomycin,     Diet  Diet: Cardiac; Healthy Heart (2-3 Na+); Fluid Consistency: Thin (IDDSI 0)           Assessment/Plan     Active Hospital Problems    Diagnosis  POA    **Dyspnea [R06.00]  Yes    HLD (hyperlipidemia) [E78.5]  Yes    Hypothyroidism (acquired) [E03.9]  Yes    Trigeminal neuralgia [G50.0]  Yes    Abnormal vaginal bleeding [N93.9]  Yes    Vitamin D deficiency [E55.9]  Yes    Chronic pain syndrome [G89.4]  Yes    Chronic back pain [M54.9, G89.29]  Yes    Hypokalemia [E87.6]  Yes    Hyponatremia [E87.1]  Yes    Anemia [D64.9]  Yes    Mass of uterine cervix determined by ultrasound [N88.8]  Yes      Resolved Hospital Problems   No resolved problems to display.       70yo woman with hypoT4, HLD, chronic low back pain, and h/o trigeminal " neuralgia, who was sent to [facility] ER from Gyn office with c/o one month of HOLLOWAY, fatigue, N/V, alteration in taste, weight loss, and intermittent vaginal bleeding. Pelvic US revealed hypervascular cervical mass that was biopsied. In [facility] ER she was found to be anemic, hyponatremic, and hypokalemic. BNP was elevated. She was transferred to [facility] for admission and further workup.    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 Moreno)  Heme/Onc consulted here  CT Chest okay  CT A/P showed obstruction of right urinary collecting system due to cervical mass as well as possible solid right renal lesion (see below)  Continue PRN meds for nausea  Oral iron with Vit C ordered    Right hydronephrosis  Right renal lesion  Fever   consulted  Cysto today with right ureteral stent and alvarez placement  T104 during procedure   started Zosyn/Vanc and sent urine for culture and cytology- UCX with gram negatives   Bcx negative at 24h  Checking MRI abdomen for further assessment of right sided renal mass      HOLLOWAY  CTA neg for PE  Echo reassuring  Card has signed off    Hyponatremia  Urine studies not c/w SIADH  Cr up, BPs low--suspect she is volume depleted and some component of obstruction (see above)  Na+ up somewhat this AM after IV NS  Continue IV NS and monitor Cr and Na+ in AM    Hypokalemia  Replaced with procotol  Mg++ level fine    HypoT4  TSH wnl  Continue L-T4    Chronic low back pain  Continue Lyrica and PRN Flexeril    Trigeminal neuralgia  Continue Tegretol      SCDs for DVT prophylaxis.  Full code.  Discussed with patient and RN.  Anticipate discharge home with family, timing yet to be determined.  Expected discharge date/ time has not been documented.      Yaakov Babcock MD  [facility]  12/01/24  11:35 EST

## 2024-12-02 LAB
ANION GAP SERPL CALCULATED.3IONS-SCNC: 14 MMOL/L (ref 5–15)
BUN SERPL-MCNC: 10 MG/DL (ref 8–23)
BUN/CREAT SERPL: 12 (ref 7–25)
C DIFF GDH + TOXINS A+B STL QL IA.RAPID: NEGATIVE
C DIFF TOX GENS STL QL NAA+PROBE: POSITIVE
CALCIUM SPEC-SCNC: 8.4 MG/DL (ref 8.6–10.5)
CHLORIDE SERPL-SCNC: 105 MMOL/L (ref 98–107)
CO2 SERPL-SCNC: 20 MMOL/L (ref 22–29)
CREAT SERPL-MCNC: 0.83 MG/DL (ref 0.57–1)
CYTO UR: NORMAL
DEPRECATED RDW RBC AUTO: 43.3 FL (ref 37–54)
EGFRCR SERPLBLD CKD-EPI 2021: 75.9 ML/MIN/1.73
ERYTHROCYTE [DISTWIDTH] IN BLOOD BY AUTOMATED COUNT: 12.7 % (ref 12.3–15.4)
GLUCOSE SERPL-MCNC: 118 MG/DL (ref 65–99)
HCT VFR BLD AUTO: 28.1 % (ref 34–46.6)
HGB BLD-MCNC: 9.4 G/DL (ref 12–15.9)
LAB AP CASE REPORT: NORMAL
LAB AP CLINICAL INFORMATION: NORMAL
MCH RBC QN AUTO: 30.9 PG (ref 26.6–33)
MCHC RBC AUTO-ENTMCNC: 33.5 G/DL (ref 31.5–35.7)
MCV RBC AUTO: 92.4 FL (ref 79–97)
PATH REPORT.FINAL DX SPEC: NORMAL
PATH REPORT.GROSS SPEC: NORMAL
PLATELET # BLD AUTO: 245 10*3/MM3 (ref 140–450)
PMV BLD AUTO: 10.1 FL (ref 6–12)
POTASSIUM SERPL-SCNC: 3.8 MMOL/L (ref 3.5–5.2)
RBC # BLD AUTO: 3.04 10*6/MM3 (ref 3.77–5.28)
SODIUM SERPL-SCNC: 139 MMOL/L (ref 136–145)
WBC NRBC COR # BLD AUTO: 6.79 10*3/MM3 (ref 3.4–10.8)

## 2024-12-02 PROCEDURE — 87449 NOS EACH ORGANISM AG IA: CPT | Performed by: STUDENT IN AN ORGANIZED HEALTH CARE EDUCATION/TRAINING PROGRAM

## 2024-12-02 PROCEDURE — 80048 BASIC METABOLIC PNL TOTAL CA: CPT | Performed by: STUDENT IN AN ORGANIZED HEALTH CARE EDUCATION/TRAINING PROGRAM

## 2024-12-02 PROCEDURE — 87493 C DIFF AMPLIFIED PROBE: CPT | Performed by: STUDENT IN AN ORGANIZED HEALTH CARE EDUCATION/TRAINING PROGRAM

## 2024-12-02 PROCEDURE — 85027 COMPLETE CBC AUTOMATED: CPT | Performed by: STUDENT IN AN ORGANIZED HEALTH CARE EDUCATION/TRAINING PROGRAM

## 2024-12-02 PROCEDURE — 25010000002 PIPERACILLIN SOD-TAZOBACTAM PER 1 G: Performed by: STUDENT IN AN ORGANIZED HEALTH CARE EDUCATION/TRAINING PROGRAM

## 2024-12-02 RX ORDER — VANCOMYCIN HYDROCHLORIDE 125 MG/1
125 CAPSULE ORAL EVERY 6 HOURS SCHEDULED
Status: DISCONTINUED | OUTPATIENT
Start: 2024-12-02 | End: 2024-12-03 | Stop reason: HOSPADM

## 2024-12-02 RX ORDER — CEFDINIR 300 MG/1
300 CAPSULE ORAL EVERY 12 HOURS SCHEDULED
Status: DISCONTINUED | OUTPATIENT
Start: 2024-12-02 | End: 2024-12-02

## 2024-12-02 RX ADMIN — TRAZODONE HYDROCHLORIDE 50 MG: 50 TABLET ORAL at 20:12

## 2024-12-02 RX ADMIN — CYCLOBENZAPRINE 10 MG: 10 TABLET, FILM COATED ORAL at 15:52

## 2024-12-02 RX ADMIN — PREGABALIN 50 MG: 50 CAPSULE ORAL at 08:39

## 2024-12-02 RX ADMIN — ATORVASTATIN CALCIUM 80 MG: 80 TABLET, FILM COATED ORAL at 20:12

## 2024-12-02 RX ADMIN — CARBAMAZEPINE 400 MG: 200 TABLET ORAL at 08:39

## 2024-12-02 RX ADMIN — LEVOTHYROXINE SODIUM 50 MCG: 0.05 TABLET ORAL at 08:39

## 2024-12-02 RX ADMIN — ACETAMINOPHEN 650 MG: 325 TABLET ORAL at 15:52

## 2024-12-02 RX ADMIN — CEFDINIR 300 MG: 300 CAPSULE ORAL at 12:20

## 2024-12-02 RX ADMIN — PIPERACILLIN AND TAZOBACTAM 3.38 G: 3; .375 INJECTION, POWDER, FOR SOLUTION INTRAVENOUS at 02:20

## 2024-12-02 RX ADMIN — CARBAMAZEPINE 400 MG: 200 TABLET ORAL at 20:12

## 2024-12-02 RX ADMIN — VANCOMYCIN HYDROCHLORIDE 125 MG: 125 CAPSULE ORAL at 17:16

## 2024-12-02 RX ADMIN — PREGABALIN 50 MG: 50 CAPSULE ORAL at 20:12

## 2024-12-02 RX ADMIN — CARBAMAZEPINE 400 MG: 200 TABLET ORAL at 12:20

## 2024-12-02 NOTE — PROGRESS NOTES
[facility] Clinical Pharmacy Services: C. Difficile Medication Changes       Pharmacy has been consulted to look over Ambreen Espinoza's profile to check patient's medications for changes due to C. Difficile diagnosis per Dr. Olvera's request.       Current C. Diff Regimen:     Assessment/Plan/Changes       1. Proton pump inhibitor: none    2. Antiperistalic agents or stool softeners/laxatives: will discontinue       Thank you for allowing me to participate in your patient's care.  Please call pharmacy with any questions or concerns.     Nikki Alvarez, PharmD  Clinical Pharmacist

## 2024-12-02 NOTE — CASE MANAGEMENT/SOCIAL WORK
Discharge Planning Assessment  [facility]     Patient Name: Ambreen Espinoza  MRN: [id number]  Today's Date: 12/2/2024    Admit Date: 11/27/2024    Plan: Home, family to transport.   Discharge Needs Assessment       Row Name 12/02/24 0850       Living Environment    People in Home spouse;grandchild(rochelle)    Current Living Arrangements home    Family Caregiver if Needed spouse    Quality of Family Relationships helpful;involved;supportive    Able to Return to Prior Arrangements yes       Transition Planning    Patient/Family Anticipates Transition to home with family    Patient/Family Anticipated Services at Transition     Transportation Anticipated car, drives self;family or friend will provide       Discharge Needs Assessment    Readmission Within the Last 30 Days no previous admission in last 30 days    Equipment Currently Used at Home none    Concerns to be Addressed no discharge needs identified;denies needs/concerns at this time    Equipment Needed After Discharge none                   Discharge Plan       Row Name 12/02/24 0850       Plan    Plan Home, family to transport.    Patient/Family in Agreement with Plan yes    Plan Comments CCP met with pt at bedside, introduced self and role of CCP. Face sheet information and pharmacy verified. Pt lives in a 2-story home with 2STE with her  and grandson. Pt still drives, works, uses no DME is IADL's. Pt denies having a living will. Pt is enrolled in meds to beds and denies trouble affording or managing her medications. Pt has used HH in the past and denies SNF history. DC plan is to return home, family to transport. Joss MORGAN/CCP                  Continued Care and Services - Admitted Since 11/27/2024    No active coordination exists for this encounter.          Demographic Summary       Row Name 12/02/24 0850       General Information    Admission Type inpatient    Arrived From home    Required Notices Provided Important Message from  Medicare    Referral Source case finding;admission list    Reason for Consult discharge planning    Preferred Language English       Contact Information    Permission Granted to Share Info With                    Functional Status       Row Name 12/02/24 0850       Functional Status    Usual Activity Tolerance excellent    Current Activity Tolerance good       Assessment of Health Literacy    How often do you have someone help you read hospital materials? Never    How often do you have problems learning about your medical condition because of difficulty understanding written information? Never    How often do you have a problem understanding what is told to you about your medical condition? Never    How confident are you filling out medical forms by yourself? Extremely    Health Literacy Excellent       Functional Status, IADL    Medications independent    Meal Preparation independent    Housekeeping independent    Laundry independent    Shopping independent                               Shahla Pardo, RN

## 2024-12-02 NOTE — PROGRESS NOTES
[facility] Clinical Pharmacy Services: C. Difficile Medication Changes       Pharmacy has been consulted to look over Ambreen Espinoza's profile to check patient's medications for changes due to C. Difficile diagnosis per Dr. Haddad's request.       Current C. Diff Regimen:     Assessment/Plan/Changes       1. Proton pump inhibitor: no    2. Antiperistalic agents or stool softeners/laxatives: no       Thank you for allowing me to participate in your patient's care.  Please call pharmacy with any questions or concerns.     Tamra Blackmon RPH  Clinical Pharmacist

## 2024-12-02 NOTE — PLAN OF CARE
Goal Outcome Evaluation:  Plan of Care Reviewed With: patient        Progress: no change  Outcome Evaluation: Still awaiting MRI, new diagnosis of c.diff, started on po vanc, mild low back pain, no vaginal bleeding today, vss, will continue to monitor

## 2024-12-02 NOTE — PROGRESS NOTES
Nutrition Services    Patient Name: Ambreen Espinoza  YOB: 1955  MRN: [id number]  Admission date: 11/27/2024    PROGRESS NOTE      Encounter Information: Pt laying in bed when I visited. Pt stated she has been eating well and has a good appetite. % most meals while admitted. Denied any n/v or chew/swallow issues.       PO Diet: Diet: Regular/House; Fluid Consistency: Thin (IDDSI 0)   PO Supplements: -   PO Intake:  %       Current nutrition support: -   Nutrition support review: -       Labs (reviewed below): Reviewed        GI Function:  Last BM 11/30       Nutrition Intervention Updates: Encourage good nutrition       Results from last 7 days   Lab Units 12/02/24 0402 12/01/24 0420 11/30/24 0423 11/29/24  0544   SODIUM mmol/L 139 135* 136 129*   POTASSIUM mmol/L 3.8 4.1 3.8 4.5  4.5   CHLORIDE mmol/L 105 103 104 96*   CO2 mmol/L 20.0* 23.0 21.9* 22.4   BUN mg/dL 10 12 14 15   CREATININE mg/dL 0.83 0.87 0.94 1.14*   CALCIUM mg/dL 8.4* 8.0* 7.4* 8.2*   BILIRUBIN mg/dL  --   --  0.2 0.3   ALK PHOS U/L  --   --  63 77   ALT (SGPT) U/L  --   --  16 21   AST (SGOT) U/L  --   --  26 30   GLUCOSE mg/dL 118* 124* 107* 129*     Results from last 7 days   Lab Units 12/02/24 0402 11/30/24 2027 11/30/24 0423 11/29/24 2003 11/29/24  0544 11/28/24  0354   MAGNESIUM mg/dL  --   --  2.0  --  2.2 2.2   PHOSPHORUS mg/dL  --   --  2.8   < > 1.6*  --    HEMOGLOBIN g/dL 9.4*   < > 7.6*   < > 8.8* 9.6*   HEMATOCRIT % 28.1*   < > 22.7*   < > 24.7* 27.4*    < > = values in this interval not displayed.     SARS-CoV-2, HI   Date Value Ref Range Status   10/12/2020 NOT DETECTED NOT DETECTED Final     Comment:     Result Comment:   A Not Detected (negative) test result for this test  means that SARS- CoV-2 RNA was not present in the specimen  above the limit of detection. A negative result does not  rule out the possibility of COVID-19 and should not be  used as the sole basis for treatment or patient  "management   decisions.  If COVID-19 is still suspected, based on   exposure history together with other clinical findings,  re-testing should be considered in consultation with  public health authorities. Laboratory test results should  always be considered in the context of clinical   observations and epidemiological data in making a final  diagnosis and patient management decisions.    Please review the \"Fact Sheets\" and FDA authorized  labeling available for health care providers and  patients using the following websites:  [email or web address]-[email or web address]   [email or web address]://[email or web address]/[email or web address]/[email or web address]/[email or web address]/  fact-sheet2    This test has been authorized by the FDA under an   Emergency Use Authorization (EUA) for use by authorized  laboratories.    Due to the current public health emergency, [facility] is receiving a high volume of samples from  a wide variety of swabs and media for COVID-19 testing.  In order to serve patients during this public health  crisis, samples from appropriate clinical sources are   being tested. Negative test results derived from  specimens received in non-commercially manufactured  viral collection and transport media, or in media and  sample collection kits not yet authorized by FDA for  COVID-19 testing should be cautiously evaluated and the  patient potentially subjected to extra precautions such  as additional clinical monitoring, including collection  of an additional specimen.    Methodology:  Nucleic Acid Amplification Test (NAAT)  includes RT-PCR or TMA      Additional information about COVID-19 can be found  at the [facility] website:  [email or web address].     Lab Results   Component Value Date    HGBA1C 5.8 (H) 03/13/2024       Wt Readings from Last 10 Encounters:   12/01/24 0505 75.8 kg (167 lb)   11/30/24 0503 71.9 kg (158 lb 8.2 oz)   11/29/24 0509 71.9 kg (158 lb 8.2 oz)   11/28/24 1355 71 kg (156 " lb 8.4 oz)   11/27/24 1930 71.6 kg (157 lb 14.4 oz)     RD to follow up per protocol.    Electronically signed by:  Sin Long RDN, LD  12/02/24 18:08 EST

## 2024-12-02 NOTE — PROGRESS NOTES
Name: Ambreen Espinoza ADMIT: 2024   : 1955  PCP: Lisseth Rankin APRN    MRN: [id number] LOS: 3 days   AGE/SEX: 69 y.o. female  ROOM: [facility]     Subjective   Subjective   2024  Patient with diarrhea today, C. Diff positive and treatment started       Objective   Objective   Vital Signs  Temp:  [97.7 °F (36.5 °C)-98.1 °F (36.7 °C)] 98.1 °F (36.7 °C)  Heart Rate:  [82-90] 82  Resp:  [18] 18  BP: (121-137)/(75-76) 121/75  SpO2:  [97 %-100 %] 100 %  on   ;   Device (Oxygen Therapy): room air  Body mass index is 27.82 kg/m².      Physical Exam  Constitutional:       General: She is not in acute distress.     Appearance: She is ill-appearing.   HENT:      Head: Normocephalic and atraumatic.      Nose: No congestion.      Mouth/Throat:      Pharynx: No oropharyngeal exudate.   Eyes:      General: No scleral icterus.     Extraocular Movements: Extraocular movements intact.      Pupils: Pupils are equal, round, and reactive to light.   Cardiovascular:      Rate and Rhythm: Normal rate and regular rhythm.   Pulmonary:      Effort: Pulmonary effort is normal. No respiratory distress.      Breath sounds: No wheezing or rales.   Abdominal:      General: There is no distension.      Palpations: Abdomen is soft.      Tenderness: There is no abdominal tenderness. There is no guarding.   Musculoskeletal:         General: No swelling, deformity or signs of injury.   Skin:     Coloration: Skin is not jaundiced.      Findings: No bruising or lesion.   Neurological:      General: No focal deficit present.      Mental Status: She is alert and oriented to person, place, and time.       Results Review     I reviewed the patient's new clinical results.  Results from last 7 days   Lab Units 24  040247   WBC 10*3/mm3 6.79 9.00  --  11.21* 10.92*   HEMOGLOBIN g/dL 9.4* 8.3* 8.4* 7.6* 7.9*   PLATELETS 10*3/mm3 245 268  --  207 174     Results from  "last 7 days   Lab Units 12/02/24 0402 12/01/24 0420 11/30/24 0423 11/29/24  0544   SODIUM mmol/L 139 135* 136 129*   POTASSIUM mmol/L 3.8 4.1 3.8 4.5  4.5   CHLORIDE mmol/L 105 103 104 96*   CO2 mmol/L 20.0* 23.0 21.9* 22.4   BUN mg/dL 10 12 14 15   CREATININE mg/dL 0.83 0.87 0.94 1.14*   GLUCOSE mg/dL 118* 124* 107* 129*   EGFR mL/min/1.73 75.9 71.8 65.4 51.9*     Results from last 7 days   Lab Units 11/30/24 0423 11/29/24  0544   ALBUMIN g/dL 2.6* 2.9*   BILIRUBIN mg/dL 0.2 0.3   ALK PHOS U/L 63 77   AST (SGOT) U/L 26 30   ALT (SGPT) U/L 16 21     Results from last 7 days   Lab Units 12/02/24 0402 12/01/24 0420 11/30/24 0423 11/29/24 2003 11/29/24  0544 11/28/24  0354   CALCIUM mg/dL 8.4* 8.0* 7.4*  --  8.2* 8.5*   ALBUMIN g/dL  --   --  2.6*  --  2.9*  --    MAGNESIUM mg/dL  --   --  2.0  --  2.2 2.2   PHOSPHORUS mg/dL  --   --  2.8 3.6 1.6*  --        No results found for: \"HGBA1C\", \"POCGLU\"    No radiology results for the last day    I have personally reviewed all medications:  Scheduled Medications  atorvastatin, 80 mg, Oral, Nightly  carBAMazepine, 400 mg, Oral, TID  ferrous sulfate, 325 mg, Oral, Every Other Day  levothyroxine, 50 mcg, Oral, Daily  pregabalin, 50 mg, Oral, BID  traZODone, 50 mg, Oral, Nightly  vancomycin, 125 mg, Oral, Q6H    Infusions  Pharmacy Consult,   Pharmacy Consult,     Diet  Diet: Regular/House; Fluid Consistency: Thin (IDDSI 0)           Assessment/Plan     Active Hospital Problems    Diagnosis  POA    **Dyspnea [R06.00]  Yes    HLD (hyperlipidemia) [E78.5]  Yes    Hypothyroidism (acquired) [E03.9]  Yes    Trigeminal neuralgia [G50.0]  Yes    Abnormal vaginal bleeding [N93.9]  Yes    Vitamin D deficiency [E55.9]  Yes    Chronic pain syndrome [G89.4]  Yes    Chronic back pain [M54.9, G89.29]  Yes    Hypokalemia [E87.6]  Yes    Hyponatremia [E87.1]  Yes    Anemia [D64.9]  Yes    Mass of uterine cervix determined by ultrasound [N88.8]  Yes      Resolved Hospital Problems   No " resolved problems to display.       68yo woman with hypoT4, HLD, chronic low back pain, and h/o trigeminal neuralgia, who was [facility] from [facility]yn office with c/o one month of HOLLOWAY, fatigue, N/V, alteration in taste, weight loss, and intermittent vaginal bleeding. Pelvic US revealed hypervascular cervical mass that was biopsied. In [facility] ER she was found to be anemic, hyponatremic, and hypokalemic. BNP was elevated. She was transferred to [facility] for admission and further workup.    Cervical mass  Abnormal vaginal bleeding  Anemia NOS  Weight loss  Most likely has gyn malignancy and will require f/u with Gyn/Onc at [facility] (Dr. Moreno)  Heme/Onc consulted here  CT Chest okay  CT A/P showed obstruction of right urinary collecting system due to cervical mass as well as possible solid right renal lesion (see below)  Continue PRN meds for nausea  Oral iron with Vit C ordered    Right hydronephrosis  Right renal lesion  Fever   consulted  Cysto today with right ureteral stent and alvarez placement  T104 during procedure   started Zosyn/Vanc and sent urine for culture and cytology- UCX with gram negatives   Bcx negative at 24h  Abx de-escalated as stable  Checking MRI abdomen for further assessment of right sided renal mass    C. Diff  C. Diff + on 12/2 but toxin negative, will have ID assess colonization from active infection  Abx de-escalated  Vancomycin 125mg PO q6h  Contact precautions      HOLLOWAY  CTA neg for PE  Echo reassuring  Card has signed off    Hyponatremia  Urine studies not c/w SIADH  Cr up, BPs low--suspect she is volume depleted and some component of obstruction (see above)  Na+ up somewhat this AM after IV NS  Sodium stable    Hypokalemia  Replaced with procotol  Mg++ level fine    HypoT4  TSH wnl  Continue L-T4    Chronic low back pain  Continue Lyrica and PRN Flexeril    Trigeminal neuralgia  Continue Tegretol      SCDs for DVT prophylaxis.  Full code.  Discussed with patient and RN.  Anticipate  discharge home with family, timing yet to be determined.  Expected discharge date/ time has not been documented.      May Haddad DO  [facility]  12/02/24  16:04 EST

## 2024-12-03 VITALS
TEMPERATURE: 98.1 F | HEART RATE: 80 BPM | OXYGEN SATURATION: 99 % | RESPIRATION RATE: 18 BRPM | DIASTOLIC BLOOD PRESSURE: 74 MMHG | SYSTOLIC BLOOD PRESSURE: 131 MMHG | WEIGHT: 167 LBS | BODY MASS INDEX: 27.82 KG/M2 | HEIGHT: 65 IN

## 2024-12-03 LAB
ABO GROUP BLD: NORMAL
ANION GAP SERPL CALCULATED.3IONS-SCNC: 6.3 MMOL/L (ref 5–15)
BLD GP AB SCN SERPL QL: NEGATIVE
BUN SERPL-MCNC: 9 MG/DL (ref 8–23)
BUN/CREAT SERPL: 9.5 (ref 7–25)
CALCIUM SPEC-SCNC: 8.3 MG/DL (ref 8.6–10.5)
CHLORIDE SERPL-SCNC: 103 MMOL/L (ref 98–107)
CO2 SERPL-SCNC: 27.7 MMOL/L (ref 22–29)
CREAT SERPL-MCNC: 0.95 MG/DL (ref 0.57–1)
DEPRECATED RDW RBC AUTO: 41.4 FL (ref 37–54)
EGFRCR SERPLBLD CKD-EPI 2021: 64.6 ML/MIN/1.73
ERYTHROCYTE [DISTWIDTH] IN BLOOD BY AUTOMATED COUNT: 12.7 % (ref 12.3–15.4)
GLUCOSE SERPL-MCNC: 94 MG/DL (ref 65–99)
HCT VFR BLD AUTO: 26.3 % (ref 34–46.6)
HGB BLD-MCNC: 9 G/DL (ref 12–15.9)
MCH RBC QN AUTO: 31.1 PG (ref 26.6–33)
MCHC RBC AUTO-ENTMCNC: 34.2 G/DL (ref 31.5–35.7)
MCV RBC AUTO: 91 FL (ref 79–97)
PLATELET # BLD AUTO: 299 10*3/MM3 (ref 140–450)
PMV BLD AUTO: 9.5 FL (ref 6–12)
POTASSIUM SERPL-SCNC: 4.6 MMOL/L (ref 3.5–5.2)
RBC # BLD AUTO: 2.89 10*6/MM3 (ref 3.77–5.28)
RH BLD: POSITIVE
SODIUM SERPL-SCNC: 137 MMOL/L (ref 136–145)
T&S EXPIRATION DATE: NORMAL
WBC NRBC COR # BLD AUTO: 6.53 10*3/MM3 (ref 3.4–10.8)

## 2024-12-03 PROCEDURE — 99222 1ST HOSP IP/OBS MODERATE 55: CPT | Performed by: PHYSICIAN ASSISTANT

## 2024-12-03 PROCEDURE — 99223 1ST HOSP IP/OBS HIGH 75: CPT | Performed by: STUDENT IN AN ORGANIZED HEALTH CARE EDUCATION/TRAINING PROGRAM

## 2024-12-03 PROCEDURE — 85027 COMPLETE CBC AUTOMATED: CPT | Performed by: STUDENT IN AN ORGANIZED HEALTH CARE EDUCATION/TRAINING PROGRAM

## 2024-12-03 PROCEDURE — 86900 BLOOD TYPING SEROLOGIC ABO: CPT | Performed by: STUDENT IN AN ORGANIZED HEALTH CARE EDUCATION/TRAINING PROGRAM

## 2024-12-03 PROCEDURE — 86901 BLOOD TYPING SEROLOGIC RH(D): CPT | Performed by: STUDENT IN AN ORGANIZED HEALTH CARE EDUCATION/TRAINING PROGRAM

## 2024-12-03 PROCEDURE — 86850 RBC ANTIBODY SCREEN: CPT | Performed by: STUDENT IN AN ORGANIZED HEALTH CARE EDUCATION/TRAINING PROGRAM

## 2024-12-03 PROCEDURE — 80048 BASIC METABOLIC PNL TOTAL CA: CPT | Performed by: STUDENT IN AN ORGANIZED HEALTH CARE EDUCATION/TRAINING PROGRAM

## 2024-12-03 RX ORDER — OXYCODONE HYDROCHLORIDE 5 MG/1
5 TABLET ORAL EVERY 4 HOURS PRN
Status: DISCONTINUED | OUTPATIENT
Start: 2024-12-03 | End: 2024-12-03 | Stop reason: HOSPADM

## 2024-12-03 RX ORDER — PANTOPRAZOLE SODIUM 40 MG/10ML
40 INJECTION, POWDER, LYOPHILIZED, FOR SOLUTION INTRAVENOUS 2 TIMES DAILY
Status: DISCONTINUED | OUTPATIENT
Start: 2024-12-03 | End: 2024-12-03 | Stop reason: HOSPADM

## 2024-12-03 RX ORDER — CARBAMAZEPINE 200 MG/1
400 TABLET ORAL 3 TIMES DAILY
Qty: 180 TABLET | Refills: 0 | Status: SHIPPED | OUTPATIENT
Start: 2024-12-03

## 2024-12-03 RX ORDER — VANCOMYCIN HYDROCHLORIDE 125 MG/1
125 CAPSULE ORAL EVERY 6 HOURS SCHEDULED
Qty: 36 CAPSULE | Refills: 0 | Status: SHIPPED | OUTPATIENT
Start: 2024-12-03 | End: 2024-12-12

## 2024-12-03 RX ORDER — OXYCODONE HYDROCHLORIDE 5 MG/1
5 TABLET ORAL EVERY 4 HOURS PRN
Qty: 10 TABLET | Refills: 0 | Status: SHIPPED | OUTPATIENT
Start: 2024-12-03 | End: 2024-12-08

## 2024-12-03 RX ORDER — FERROUS SULFATE 325(65) MG
325 TABLET ORAL EVERY OTHER DAY
Qty: 15 TABLET | Refills: 2 | Status: SHIPPED | OUTPATIENT
Start: 2024-12-05 | End: 2025-02-26

## 2024-12-03 RX ADMIN — PREGABALIN 50 MG: 50 CAPSULE ORAL at 08:37

## 2024-12-03 RX ADMIN — VANCOMYCIN HYDROCHLORIDE 125 MG: 125 CAPSULE ORAL at 05:57

## 2024-12-03 RX ADMIN — VANCOMYCIN HYDROCHLORIDE 125 MG: 125 CAPSULE ORAL at 00:18

## 2024-12-03 RX ADMIN — LEVOTHYROXINE SODIUM 50 MCG: 0.05 TABLET ORAL at 08:37

## 2024-12-03 RX ADMIN — PANTOPRAZOLE SODIUM 40 MG: 40 INJECTION, POWDER, FOR SOLUTION INTRAVENOUS at 11:05

## 2024-12-03 RX ADMIN — ACETAMINOPHEN 650 MG: 325 TABLET ORAL at 08:47

## 2024-12-03 RX ADMIN — CARBAMAZEPINE 400 MG: 200 TABLET ORAL at 12:10

## 2024-12-03 RX ADMIN — OXYCODONE HYDROCHLORIDE 5 MG: 5 TABLET ORAL at 12:10

## 2024-12-03 RX ADMIN — CARBAMAZEPINE 400 MG: 200 TABLET ORAL at 08:38

## 2024-12-03 RX ADMIN — VANCOMYCIN HYDROCHLORIDE 125 MG: 125 CAPSULE ORAL at 11:05

## 2024-12-03 RX ADMIN — FERROUS SULFATE TAB 325 MG (65 MG ELEMENTAL FE) 325 MG: 325 (65 FE) TAB at 08:37

## 2024-12-03 NOTE — DISCHARGE INSTRUCTIONS
- Follow up with PCP in 3-5 days  - Take Ferrous Sulfate 325mg PO every other day  - Take Roxicodone 5mg PO q4h PRN pain  - Take Vancocin 125mg PO q6h for 36 doses  - Tegretrol increased to 400mg PO TID  - Follow up with Dr. Soto (urology) for stent and renal mass  - Follow up with Dr. Gertrudis Moreno at [facility] Onc, referral by oncology sent

## 2024-12-03 NOTE — CONSULTS
"Referring Provider: No Known Provider  Reason for Consultation:     c. diff positive but toxin negative, started on po vanc, assess for colonization vs infection         Subjective   History of present illness: Patient is a 69-year-old female with past medical history of trigeminal neuralgia who presents with dyspnea, nausea vomiting, fatigue and intermittent vaginal bleeding.  ID consulted for \"C. difficile positive but toxin negative, started on p.o. Vanco, assess for colonization versus infection\".    Patient reports she continues to have small amount of diarrhea today with bloody stools.  States that she has seen some improvement with oral vancomycin.  She remains afebrile with no leukocytosis.    History reviewed. No pertinent past medical history.    Past Surgical History:   Procedure Laterality Date    CYSTOSCOPY W/ URETERAL STENT PLACEMENT Right 11/29/2024    Procedure: CYSTOSCOPY URETERAL CATHETER/STENT INSERTION;  Surgeon: Marcelo Soto MD;  Location: [facility];  Service: Urology;  Laterality: Right;       family history is not on file.     reports that she has never smoked. She has never used smokeless tobacco. She reports that she does not drink alcohol and does not use drugs.     No Known Allergies    Medication:  Antibiotics:  Anti-Infectives (From admission, onward)      Ordered     Dose/Rate Route Frequency Start Stop    12/02/24 1509  vancomycin (VANCOCIN) capsule 125 mg        Ordering Provider: May Haddad,     125 mg Oral Every 6 Hours Scheduled 12/02/24 1800 12/12/24 1759    11/29/24 1400  piperacillin-tazobactam (ZOSYN) 3.375 g IVPB in 100 mL NS MBP (CD)        Ordering Provider: Marcelo Soto MD    3.375 g  over 30 Minutes Intravenous Once 11/29/24 1500 11/29/24 1837    11/29/24 1116  ceFAZolin 2000 mg IVPB in 100 mL NS (MBP)        Ordering Provider: Marcelo Soto MD    2,000 mg  over 30 Minutes Intravenous Once 11/29/24 1118 11/29/24 1219      " "        Objective     Physical Exam:   Vital Signs   Temp:  [97.5 °F (36.4 °C)-98.1 °F (36.7 °C)] 98.1 °F (36.7 °C)  Heart Rate:  [79-87] 81  Resp:  [18] 18  BP: (112-141)/(63-76) 141/76    GENERAL: Awake and alert, in no acute distress.   HEENT: Oropharynx is clear. Hearing is grossly normal.   LUNGS: Normal work of breathing  GI: Soft nondistended  SKIN: Warm and dry without cutaneous eruptions in exposed areas  PSYCHIATRIC: Appropriate mood, affect, insight, and judgment.     Results Review:   I reviewed the patient's new clinical results.  I reviewed the patient's new imaging results and agree with the interpretation.  I reviewed the patient's other test results and agree with the interpretation    Lab Results   Component Value Date    WBC 6.53 12/03/2024    HGB 9.0 (L) 12/03/2024    HCT 26.3 (L) 12/03/2024    MCV 91.0 12/03/2024     12/03/2024       No results found for: \"VANCOPEAK\", \"VANCOTROUGH\", \"VANCORANDOM\"    Lab Results   Component Value Date    GLUCOSE 94 12/03/2024    BUN 9 12/03/2024    CREATININE 0.95 12/03/2024    EGFRIFNONA 71 03/04/2020    EGFRIFAFRI >60 03/09/2023    BCR 9.5 12/03/2024    CO2 27.7 12/03/2024    CALCIUM 8.3 (L) 12/03/2024    ALBUMIN 2.6 (L) 11/30/2024    LABIL2 1.5 03/09/2023    AST 26 11/30/2024    ALT 16 11/30/2024         Estimated Creatinine Clearance: 56.1 mL/min (by C-G formula based on SCr of 0.95 mg/dL).      Microbiology:  11/29 urine culture greater than 100,000 E. coli  11/29 blood cultures no growth to date  11/29 MRSA nares negative  12/2 C. difficile PCR positive and toxin negative    Radiology:  11/28: CT chest abdomen pelvis report reviewed with moderate right hydronephrosis and lobulated masslike configuration of the cervix.  No evidence of bowel obstruction or abnormal thickening of the bowel.  No pleural effusions or focal consolidation of the lungs.    Assessment     #C. difficile PCR positive and toxin negative  #Diarrhea  #Cervical mass  #Right " hydronephrosis    Discussed C. difficile with the patient and the risks and benefits of therapy.  Patient thinks she is getting improvement with oral vancomycin and would like to continue.  Continue vancomycin 125 mg 4 times daily for 10-day course.    At this point it seems more likely that this is related to colonization than true infection however after discussion with the patient she would like to continue with therapy.    Thank you for allowing me to be involved in the care of this patient. Infectious diseases will sign off at this time with antibiotics plan in place, but please call me at [phone] if any further ID questions or new ID concerns.

## 2024-12-03 NOTE — PROGRESS NOTES
Name: Ambreen Espinoza ADMIT: 2024   : 1955  PCP: Lisseth Rankin APRN    MRN: [id number] LOS: 4 days   AGE/SEX: 69 y.o. female  ROOM: [facility]     Subjective   Subjective   2024  Patient with diarrhea today, C. Diff positive and treatment started    12/3/2024  Patient still with some diarrhea this morning, will continue PO Vanc.  Admits to blood in stool.        Objective   Objective   Vital Signs  Temp:  [97.7 °F (36.5 °C)-98.1 °F (36.7 °C)] 98.1 °F (36.7 °C)  Heart Rate:  [79-81] 80  Resp:  [18] 18  BP: (112-141)/(63-76) 131/74  SpO2:  [98 %-99 %] 99 %  on   ;   Device (Oxygen Therapy): room air  Body mass index is 27.82 kg/m².      Physical Exam  Constitutional:       General: She is not in acute distress.     Appearance: She is ill-appearing.   HENT:      Head: Normocephalic and atraumatic.      Nose: No congestion.      Mouth/Throat:      Pharynx: No oropharyngeal exudate.   Eyes:      General: No scleral icterus.     Extraocular Movements: Extraocular movements intact.      Pupils: Pupils are equal, round, and reactive to light.   Cardiovascular:      Rate and Rhythm: Normal rate and regular rhythm.   Pulmonary:      Effort: Pulmonary effort is normal. No respiratory distress.      Breath sounds: No wheezing or rales.   Abdominal:      General: There is no distension.      Palpations: Abdomen is soft.      Tenderness: There is no abdominal tenderness. There is no guarding.   Musculoskeletal:         General: No swelling, deformity or signs of injury.   Skin:     Coloration: Skin is not jaundiced.      Findings: No bruising or lesion.   Neurological:      General: No focal deficit present.      Mental Status: She is alert and oriented to person, place, and time.       Results Review     I reviewed the patient's new clinical results.  Results from last 7 days   Lab Units 24  0305 24  0402 24   WBC 10*3/mm3 6.53 6.79 9.00  --   "11.21*   HEMOGLOBIN g/dL 9.0* 9.4* 8.3* 8.4* 7.6*   PLATELETS 10*3/mm3 299 245 268  --  207     Results from last 7 days   Lab Units 12/03/24  0305 12/02/24  0402 12/01/24 0420 11/30/24  0423   SODIUM mmol/L 137 139 135* 136   POTASSIUM mmol/L 4.6 3.8 4.1 3.8   CHLORIDE mmol/L 103 105 103 104   CO2 mmol/L 27.7 20.0* 23.0 21.9*   BUN mg/dL 9 10 12 14   CREATININE mg/dL 0.95 0.83 0.87 0.94   GLUCOSE mg/dL 94 118* 124* 107*   EGFR mL/min/1.73 64.6 75.9 71.8 65.4     Results from last 7 days   Lab Units 11/30/24 0423 11/29/24  0544   ALBUMIN g/dL 2.6* 2.9*   BILIRUBIN mg/dL 0.2 0.3   ALK PHOS U/L 63 77   AST (SGOT) U/L 26 30   ALT (SGPT) U/L 16 21     Results from last 7 days   Lab Units 12/03/24  0305 12/02/24 0402 12/01/24 0420 11/30/24 0423 11/29/24 2003 11/29/24  0544 11/28/24  0354   CALCIUM mg/dL 8.3* 8.4* 8.0* 7.4*  --  8.2* 8.5*   ALBUMIN g/dL  --   --   --  2.6*  --  2.9*  --    MAGNESIUM mg/dL  --   --   --  2.0  --  2.2 2.2   PHOSPHORUS mg/dL  --   --   --  2.8 3.6 1.6*  --        No results found for: \"HGBA1C\", \"POCGLU\"    No radiology results for the last day    I have personally reviewed all medications:  Scheduled Medications  atorvastatin, 80 mg, Oral, Nightly  carBAMazepine, 400 mg, Oral, TID  ferrous sulfate, 325 mg, Oral, Every Other Day  levothyroxine, 50 mcg, Oral, Daily  pantoprazole, 40 mg, Intravenous, BID  pregabalin, 50 mg, Oral, BID  traZODone, 50 mg, Oral, Nightly  vancomycin, 125 mg, Oral, Q6H    Infusions  Pharmacy Consult,   Pharmacy Consult,     Diet  Diet: Regular/House; Fluid Consistency: Thin (IDDSI 0)           Assessment/Plan     Active Hospital Problems    Diagnosis  POA    **Dyspnea [R06.00]  Yes    HLD (hyperlipidemia) [E78.5]  Yes    Hypothyroidism (acquired) [E03.9]  Yes    Trigeminal neuralgia [G50.0]  Yes    Abnormal vaginal bleeding [N93.9]  Yes    Vitamin D deficiency [E55.9]  Yes    Chronic pain syndrome [G89.4]  Yes    Chronic back pain [M54.9, G89.29]  Yes    " Hypokalemia [E87.6]  Yes    Hyponatremia [E87.1]  Yes    Anemia [D64.9]  Yes    Mass of uterine cervix determined by ultrasound [N88.8]  Yes      Resolved Hospital Problems   No resolved problems to display.       68yo woman with hypoT4, HLD, chronic low back pain, and h/o trigeminal neuralgia, who was sent to [facility] ER from Gyn office with c/o one month of HOLLOWAY, fatigue, N/V, alteration in taste, weight loss, and intermittent vaginal bleeding. Pelvic US revealed hypervascular cervical mass that was biopsied. In [facility] ER she was found to be anemic, hyponatremic, and hypokalemic. BNP was elevated. She was transferred to [facility] for admission and further workup.    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 Moreno)  Heme/Onc consulted here  CT Chest okay  CT A/P showed obstruction of right urinary collecting system due to cervical mass as well as possible solid right renal lesion (see below)  Continue PRN meds for nausea  Oral iron with Vit C ordered    Right hydronephrosis  Right renal lesion  Fever   consulted  Cysto today with right ureteral stent and alvarez placement  T104 during procedure   started Zosyn/Vanc and sent urine for culture and cytology- UCX with gram negatives   Bcx negative at 24h  Abx de-escalated as stable  Checking MRI abdomen for further assessment of right sided renal mass    C. Diff  C. Diff + on 12/2 but toxin negative, will have ID assess colonization from active infection  Abx de-escalated  Vancomycin 125mg PO q6h, complete 10 day course  Contact precautions    GI Bleed  Admits to blood in stool  GI plans no further workup      HOLLOWAY  CTA neg for PE  Echo reassuring  Card has signed off    Hyponatremia  Urine studies not c/w SIADH  Cr up, BPs low--suspect she is volume depleted and some component of obstruction (see above)  Na+ up somewhat this AM after IV NS  Sodium  stable    Hypokalemia  Replaced with procotol  Mg++ level fine    HypoT4  TSH wnl  Continue L-T4    Chronic low back pain  Continue Lyrica and PRN Flexeril    Trigeminal neuralgia  Continue Tegretol      SCDs for DVT prophylaxis.  Full code.  Discussed with patient and RN.  Anticipate discharge home with family, timing yet to be determined.  Expected Discharge Date: 12/5/2024; Expected Discharge Time:       May Haddad DO  [facility]  12/03/24  16:10 EST

## 2024-12-03 NOTE — CONSULTS
[facility]  Initial Inpatient Consult Note    Referring Provider: MD Catie    Reason for Consultation: Diarrhea, rectal bleeding    Subjective     History of present illness:    69 y.o. female with a history of hypothyroidism, hyperlipidemia, chronic low back pain, trigeminal neuralgia, cervical mass who presented to [facility] ED in the setting of dyspnea on exertion, nausea, vomiting, weight loss, intermittent vaginal bleeding.    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  Patient is being followed by oncology here at [facility].  Patient did have findings of right hydronephrosis and underwent right ureter stent placement.  She has been on empiric antibiotics.  Yesterday she developed diarrhea and underwent C. difficile testing.  C. difficile positive but toxin negative.  Patient was initiated on p.o. vancomycin and was seen by ID who felt that this was likely due to colonization.  Patient felt more comfortable continuing on therapy.  Patient reports she had diarrhea starting around yesterday.  She is having a small amount of loose stools when she urinates.  She reported some small light pink dots on her pads in her underwear.  She is not having significant frequency of the diarrhea.  She denies any abdominal pain, nausea, vomiting.  She is having no issue with nutrition or p.o. intake.  She is not on any blood thinners.  She has never had a colonoscopy but did undergo Cologuard a couple years ago which was negative.  She does report a history of hemorrhoids.  No family history of colon cancer.  She has been experiencing some vaginal bleeding but does not feel that this is from her vagina.    Past Medical History:  History reviewed. No pertinent past medical history.  Past Surgical History:  Past Surgical History:   Procedure Laterality Date    CYSTOSCOPY W/ URETERAL STENT PLACEMENT Right 11/29/2024     Procedure: CYSTOSCOPY URETERAL CATHETER/STENT INSERTION;  Surgeon: Marcelo Soto MD;  Location: [facility];  Service: Urology;  Laterality: Right;      Social History:   Social History     Tobacco Use    Smoking status: Never    Smokeless tobacco: Never   Substance Use Topics    Alcohol use: Never      Family History:  History reviewed. No pertinent family history.    Home Meds:  Medications Prior to Admission   Medication Sig Dispense Refill Last Dose/Taking    acetaminophen (TYLENOL) 500 MG tablet Take 1 tablet by mouth Every 4 (Four) Hours As Needed for Mild Pain.   11/27/2024    atorvastatin (LIPITOR) 80 MG tablet Take 1 tablet by mouth Every Night.   11/26/2024    carBAMazepine (TEGretol) 200 MG tablet Take 1 tablet by mouth 3 (Three) Times a Day. Pt takes 2 tablets (400mg) TID   11/26/2024    ezetimibe (ZETIA) 10 MG tablet Take 1 tablet by mouth Daily. Pt takes 2 tablets (20mg) once daily   11/26/2024    hydrOXYzine pamoate (VISTARIL) 25 MG capsule Take 1 capsule by mouth 3 (Three) Times a Day As Needed for Itching. 1-2 capsule TID PRN   Taking As Needed    ibuprofen (ADVIL,MOTRIN) 600 MG tablet Take 1 tablet by mouth Every 6 (Six) Hours As Needed for Mild Pain.   11/27/2024    levothyroxine (SYNTHROID, LEVOTHROID) 50 MCG tablet Take 1 tablet by mouth Daily.   11/26/2024    meloxicam (MOBIC) 15 MG tablet Take 1 tablet by mouth Daily.   11/26/2024    ondansetron (ZOFRAN) 4 MG tablet Take 1 tablet by mouth Every 8 (Eight) Hours As Needed for Nausea or Vomiting.   11/27/2024    pregabalin (LYRICA) 50 MG capsule Take 1 capsule by mouth 2 (Two) Times a Day.   11/26/2024    traZODone (DESYREL) 50 MG tablet Take 1 tablet by mouth Every Night.   11/26/2024    vitamin D (ERGOCALCIFEROL) 1.25 MG (27686 UT) capsule capsule Take 1 capsule by mouth 1 (One) Time Per Week. Sunday   Past Week    cyclobenzaprine (FLEXERIL) 10 MG tablet Take 1 tablet by mouth 2 (Two) Times a Day As Needed for Muscle Spasms.     "    Current Meds:   atorvastatin, 80 mg, Oral, Nightly  carBAMazepine, 400 mg, Oral, TID  ferrous sulfate, 325 mg, Oral, Every Other Day  levothyroxine, 50 mcg, Oral, Daily  pantoprazole, 40 mg, Intravenous, BID  pregabalin, 50 mg, Oral, BID  traZODone, 50 mg, Oral, Nightly  vancomycin, 125 mg, Oral, Q6H      Allergies:  No Known Allergies    Objective     Vital Signs  Temp:  [97.5 °F (36.4 °C)-98.1 °F (36.7 °C)] 98.1 °F (36.7 °C)  Heart Rate:  [79-87] 81  Resp:  [18] 18  BP: (112-141)/(63-76) 141/76  Physical Exam:  General Appearance:     Alert, cooperative, in no acute distress   Abdomen:     Normal bowel sounds, no masses, no organomegaly, soft     nontender, nondistended, no guarding, no rebound                 tenderness   Rectal:   Patient declined rectal exam.       Results Review:   I reviewed the patient's new clinical results.    Results from last 7 days   Lab Units 12/03/24  0305 12/02/24  0402 12/01/24  0420   WBC 10*3/mm3 6.53 6.79 9.00   HEMOGLOBIN g/dL 9.0* 9.4* 8.3*   HEMATOCRIT % 26.3* 28.1* 23.8*   PLATELETS 10*3/mm3 299 245 268     Results from last 7 days   Lab Units 12/03/24  0305 12/02/24  0402 12/01/24  0420 11/30/24  0423 11/29/24  0544   SODIUM mmol/L 137 139 135* 136 129*   POTASSIUM mmol/L 4.6 3.8 4.1 3.8 4.5  4.5   CHLORIDE mmol/L 103 105 103 104 96*   CO2 mmol/L 27.7 20.0* 23.0 21.9* 22.4   BUN mg/dL 9 10 12 14 15   CREATININE mg/dL 0.95 0.83 0.87 0.94 1.14*   CALCIUM mg/dL 8.3* 8.4* 8.0* 7.4* 8.2*   BILIRUBIN mg/dL  --   --   --  0.2 0.3   ALK PHOS U/L  --   --   --  63 77   ALT (SGPT) U/L  --   --   --  16 21   AST (SGOT) U/L  --   --   --  26 30   GLUCOSE mg/dL 94 118* 124* 107* 129*         No results found for: \"LIPASE\"    Radiology:  [place] Retrograde Pyelogram In OR   Final Result       As described.       This report was finalized on 11/29/2024 1:23 PM by Dr. Rivas Souza M.D on Workstation: [facility]          CT Angiogram Chest   Final Result           1. No pulmonary " embolism.       2. Moderate to large right hydronephrosis and right hydroureter up to   the level of the cervical mass suggesting at least partial obstructive   effect on the right urinary collecting system, urology consultation   advised. Indeterminate right renal lesion, as described.       3. Colonic diverticulosis. No acute inflammatory process of bowel is   identified.       This report was finalized on 11/28/2024 2:37 PM by Dr. Rivas Souza M.D on Workstation: [email or web address]          CT Abdomen Pelvis With Contrast   Final Result           1. No pulmonary embolism.       2. Moderate to large right hydronephrosis and right hydroureter up to   the level of the cervical mass suggesting at least partial obstructive   effect on the right urinary collecting system, urology consultation   advised. Indeterminate right renal lesion, as described.       3. Colonic diverticulosis. No acute inflammatory process of bowel is   identified.       This report was finalized on 11/28/2024 2:37 PM by Dr. Rivas Souza M.D on Workstation: [email or web address]          XR Chest 1 View   Final Result      MRI Abdomen With & Without Contrast    (Results Pending)       Assessment & Plan   Assessment:   Diarrhea   Cdiff positive, toxin negative   Rectal bleeding?  Cervical mass w/ abnormal vaginal bleeding  Rt hydronephrosis s/p rt ureteral stent - recent course of IV zosyn/vanc   HO[place]     Plan:   Patient is having a small amount of loose stools with episodes of urination suggesting a component of pelvic floor dysfunction.  Findings do not seem consistent with C. difficile and agree she is likely colonized.  It appears patient would feel more comfortable continuing the entire course of vancomycin.  Will leave that up to the primary team.  Diarrhea could also be secondary to use of recent broad-spectrum antibiotics.  Discussed with patient about starting probiotics or drinking Kefer after completion of PO vancomycin.  Discussed with  patient that findings on her pad could be due to a vaginal or urinary source with recent procedure but to work up bleeding from a GI standpoint we would need to evaluate further with a colonoscopy.  Patient declined both rectal exam and colonoscopy.  Nothing else to offer from a GI standpoint.  We will sign off.    I discussed the patients findings and my recommendations with patient.    Dictated utilizing Dragon dictation.         Allie West PA-C   [facility]  [address] [place] [address]  Office: [phone]

## 2024-12-03 NOTE — PLAN OF CARE
Goal Outcome Evaluation:  Plan of Care Reviewed With: patient           Outcome Evaluation: patient to be discharged home with family to transport.

## 2024-12-03 NOTE — CASE MANAGEMENT/SOCIAL WORK
Continued Stay Note  [facility]     Patient Name: Ambreen Espinoza  MRN: [id number]  Today's Date: 12/3/2024    Admit Date: 11/27/2024    Plan: Home with family to transport.   Discharge Plan       Row Name 12/03/24 1107       Plan    Plan Home with family to transport.    Patient/Family in Agreement with Plan yes    Plan Comments CCP met with pt at bedside to confirm DC plan and discuss vancomycin cost. Per pt plan remains the same. Pt will return home with family to transport. CCP discussed cost of Vancomycin will be $90. Per pt the cost of the vancomycin is affordable for her.   RLutes RN/CCP                   Discharge Codes    No documentation.                 Expected Discharge Date and Time       Expected Discharge Date Expected Discharge Time    Dec 5, 2024               Aliyah Banegas RN

## 2024-12-03 NOTE — DISCHARGE SUMMARY
Patient Name: Ambreen Espinoza  : 1955  MRN: [id number]    Date of Admission: 2024  Date of Discharge:  12/3/2024  Primary Care Physician: Lisseth Rankin APRN      Chief Complaint:   No chief complaint on file.      Discharge Diagnoses     Active Hospital Problems    Diagnosis  POA    **Dyspnea [R06.00]  Yes    HLD (hyperlipidemia) [E78.5]  Yes    Hypothyroidism (acquired) [E03.9]  Yes    Trigeminal neuralgia [G50.0]  Yes    Abnormal vaginal bleeding [N93.9]  Yes    Vitamin D deficiency [E55.9]  Yes    Chronic pain syndrome [G89.4]  Yes    Chronic back pain [M54.9, G89.29]  Yes    Hypokalemia [E87.6]  Yes    Hyponatremia [E87.1]  Yes    Anemia [D64.9]  Yes    Mass of uterine cervix determined by ultrasound [N88.8]  Yes      Resolved Hospital Problems   No resolved problems to display.        Hospital Course     Ms. Espinoza is a 69 y.o. female with a history of HLD, hypothyroid, trigeminal neuralgia who presented to [facility] from [facility] ED due to dyspnea.  Of note, patient had vaginal bleeding and an endometrial biopsy done earlier in the day.  She was admitted with cardiac workup.  Please see the admitting history and physical for further details.  Cardiology workup negative and cardiology signed off. Oncology consulted and recommended she follow up with [facility] for cervical mass.  CT showed right hydronephrosis and hydroureter with right renal lesion, urology consulted and placed a right ureteral stent.  She was covered with abx for uti.  Urology recommended otpt follow up and signed off.  Due to diarrhea, she was C. Diff positive but antigen negative, most likely colonized but she was to complete a 10 day course or PO Vancomycin, ID agreed and signed off.  She had some faint GI bleed but declined further workup and GI signed off. Patient was at baseline with specialist signed off, she was eager and agreeable to discharge with discharge plan below and discharged  home on 12/3/2024.    #Discharge Plan  - Follow up with PCP in 3-5 days  - Take Ferrous Sulfate 325mg PO every other day  - Take Roxicodone 5mg PO q4h PRN pain  - Take Vancocin 125mg PO q6h for 36 doses  - Tegretrol increased to 400mg PO TID  - Follow up with Dr. Soto (urology) for stent and renal mass  - Follow up with Dr. Gertrudis Moreno at [facility], referral by oncology sent    Day of Discharge     Subjective:  GI signed off, patient near baseline and agreeable with discharge home    Physical Exam:  Temp:  [97.7 °F (36.5 °C)-98.1 °F (36.7 °C)] 98.1 °F (36.7 °C)  Heart Rate:  [79-81] 80  Resp:  [18] 18  BP: (112-141)/(63-76) 131/74  Body mass index is 27.82 kg/m².  Physical Exam  Constitutional:       General: She is not in acute distress.     Appearance: Normal appearance. She is not toxic-appearing.   HENT:      Head: Normocephalic and atraumatic.      Nose: Nose normal. No congestion.      Mouth/Throat:      Pharynx: Oropharynx is clear. No oropharyngeal exudate.   Eyes:      General: No scleral icterus.  Cardiovascular:      Rate and Rhythm: Normal rate and regular rhythm.      Heart sounds: No murmur heard.     No friction rub. No gallop.   Pulmonary:      Effort: No respiratory distress.      Breath sounds: No wheezing or rales.   Abdominal:      General: There is no distension.      Tenderness: There is no abdominal tenderness. There is no guarding.   Musculoskeletal:         General: No swelling or deformity.      Cervical back: Normal range of motion. No rigidity.      Right lower leg: No edema.      Left lower leg: No edema.   Skin:     Coloration: Skin is not jaundiced.      Findings: No bruising or lesion.   Neurological:      General: No focal deficit present.      Mental Status: She is alert and oriented to person, place, and time.      Motor: No weakness.         Consultants     Consult Orders (all) (From admission, onward)       Start     Ordered    12/03/24 2217  Inpatient Gastroenterology  Consult  Once        Specialty:  Gastroenterology  Provider:  William Jefferson MD    12/03/24 0936    12/02/24 1608  Inpatient Infectious Diseases Consult  Once        Specialty:  Infectious Diseases  Provider:  Venus Harrison MD    12/02/24 1607    11/28/24 1509  Inpatient Urology Consult  Once        Specialty:  Urology  Provider:  Mono Teresa MD    11/28/24 1509    11/28/24 0810  Hematology & Oncology Inpatient Consult  Once        Specialty:  Hematology and Oncology  Provider:  Daphne Yi MD    11/28/24 0810    11/28/24 0337  Inpatient Nutrition Consult  Once        Provider:  (Not yet assigned)    11/28/24 0336    11/27/24 2031  Inpatient Cardiology Consult  Once        Specialty:  Cardiology  Provider:  Ally Mejia MD    11/27/24 2031                  Procedures     CYSTOSCOPY URETERAL CATHETER/STENT INSERTION    Imaging Results (All)       Procedure Component Value Units Date/Time    FL Retrograde Pyelogram In OR [[id number]] Collected: 11/29/24 1322     Updated: 11/29/24 1326    Narrative:      FL RETROGRADE PYELOGRAM IN OR-     INDICATIONS: Retrograde pyelography and ureteral stent placement     TECHNIQUE: FLUOROSCOPIC ASSISTANCE IN THE OPERATING ROOM.     FINDINGS:     4 intraoperative fluoroscopic spot views were obtained and recorded the  PACS for review, revealing right retrograde pyelography, right  hydronephrosis, right ureteral stent placement. Please see operative  report for full details.     Fluoroscopy time: 2.3 minutes     Radiation exposure: Dose area product: 1365.57 uGy x m(2)          Impression:         As described.     This report was finalized on 11/29/2024 1:23 PM by Dr. Rivas Souza M.D on Workstation: [id number]       CT Angiogram Chest [[id number]] Collected: 11/28/24 1425     Updated: 11/28/24 1440    Narrative:      CT ANGIOGRAM CHEST-, CT ABDOMEN PELVIS W CONTRAST-     INDICATIONS: Short of breath, anemia, cervical mass, nausea, vomiting.  Radiation  dose reduction techniques were utilized, including automated  exposure control and exposure modulation based on body size.     TECHNIQUE: CT ANGIOGRAPHY OF THE CHEST. THREE-DIMENSIONAL  RECONSTRUCTIONS. Enhanced CT of the abdomen, pelvis     COMPARISON: None available     FINDINGS:     Chest CTA:     No pulmonary embolism. No aortic dissection.     The heart size is normal without pericardial effusion. Pericardial  recess fluid is seen. Small coronary arterial calcification is apparent.  A few small subcentimeter short axis mediastinal lymph nodes are seen  that are not significant by size criteria. Thyroid nodularity is  apparent, suboptimally evaluated with this technique, thyroid ultrasound  correlation advised as indicated.     The airways appear clear.     No pleural effusion or pneumothorax.     The lungs show no focal pulmonary consolidation or mass. Small  atelectasis/scarring is apparent in both lungs.                 Abdomen pelvis CT:     Hepatic and left renal low densities are seen that are too small to  characterize.     A right renal 1.2 cm lesion shows greater density than expected for  simple cyst, could be a hyperdense cyst or solid lesion, dedicated renal  MRI or ultrasound advised for further evaluation.     Moderate to large right hydronephrosis and right hydroureter to the  level of the cervix are noted suggesting obstructive process, possibly  related to stated history of cervical mass. Lobulated masslike  configuration of the cervix is noted, 4.8 cm on sagittal image 74     Otherwise unremarkable appearance of the liver, spleen, adrenal glands,  pancreas, kidneys, bladder.     No bowel obstruction or abnormal bowel thickening is identified. Colonic  diverticula are seen that do not appear inflamed.     No free intraperitoneal gas. Minimal pelvic free fluid.     Scattered small mesenteric and para-aortic lymph nodes are seen that are  not significant by size criteria.     Abdominal aorta is  not aneurysmal. Aortic and other arterial  calcifications are present.     Degenerative changes are seen in the spine. No acute fracture is  identified.             Impression:            1. No pulmonary embolism.     2. Moderate to large right hydronephrosis and right hydroureter up to  the level of the cervical mass suggesting at least partial obstructive  effect on the right urinary collecting system, urology consultation  advised. Indeterminate right renal lesion, as described.     3. Colonic diverticulosis. No acute inflammatory process of bowel is  identified.     This report was finalized on 11/28/2024 2:37 PM by Dr. Rivas Souza M.D on Workstation: [employer]       CT Abdomen Pelvis With Contrast [[id number]] Collected: 11/28/24 1425     Updated: 11/28/24 1440    Narrative:      CT ANGIOGRAM CHEST-, CT ABDOMEN PELVIS W CONTRAST-     INDICATIONS: Short of breath, anemia, cervical mass, nausea, vomiting.  Radiation dose reduction techniques were utilized, including automated  exposure control and exposure modulation based on body size.     TECHNIQUE: CT ANGIOGRAPHY OF THE CHEST. THREE-DIMENSIONAL  RECONSTRUCTIONS. Enhanced CT of the abdomen, pelvis     COMPARISON: None available     FINDINGS:     Chest CTA:     No pulmonary embolism. No aortic dissection.     The heart size is normal without pericardial effusion. Pericardial  recess fluid is seen. Small coronary arterial calcification is apparent.  A few small subcentimeter short axis mediastinal lymph nodes are seen  that are not significant by size criteria. Thyroid nodularity is  apparent, suboptimally evaluated with this technique, thyroid ultrasound  correlation advised as indicated.     The airways appear clear.     No pleural effusion or pneumothorax.     The lungs show no focal pulmonary consolidation or mass. Small  atelectasis/scarring is apparent in both lungs.                 Abdomen pelvis CT:     Hepatic and left renal low densities are seen  that are too small to  characterize.     A right renal 1.2 cm lesion shows greater density than expected for  simple cyst, could be a hyperdense cyst or solid lesion, dedicated renal  MRI or ultrasound advised for further evaluation.     Moderate to large right hydronephrosis and right hydroureter to the  level of the cervix are noted suggesting obstructive process, possibly  related to stated history of cervical mass. Lobulated masslike  configuration of the cervix is noted, 4.8 cm on sagittal image 74     Otherwise unremarkable appearance of the liver, spleen, adrenal glands,  pancreas, kidneys, bladder.     No bowel obstruction or abnormal bowel thickening is identified. Colonic  diverticula are seen that do not appear inflamed.     No free intraperitoneal gas. Minimal pelvic free fluid.     Scattered small mesenteric and para-aortic lymph nodes are seen that are  not significant by size criteria.     Abdominal aorta is not aneurysmal. Aortic and other arterial  calcifications are present.     Degenerative changes are seen in the spine. No acute fracture is  identified.             Impression:            1. No pulmonary embolism.     2. Moderate to large right hydronephrosis and right hydroureter up to  the level of the cervical mass suggesting at least partial obstructive  effect on the right urinary collecting system, urology consultation  advised. Indeterminate right renal lesion, as described.     3. Colonic diverticulosis. No acute inflammatory process of bowel is  identified.     This report was finalized on 11/28/2024 2:37 PM by Dr. Rivas Souza M.D on Workstation: [id number]       XR Chest 1 View [[id number]] Collected: 11/28/24 1306     Updated: 11/28/24 1309    Narrative:      XR CHEST 1 VW-     CLINICAL HISTORY:  sob     COMPARISON:  None     FINDINGS: A single portable view of the chest demonstrates the heart to  be within normal limits in size. There is no evidence of focal  infiltrate, effusion  or congestive failure. There is mild interstitial  vascular prominence appreciated involving the parahilar regions  bilaterally.           This report was finalized on 11/28/2024 1:06 PM by Dr. Rolando Teixeira M.D on Workstation: [id number]               Results for orders placed during the hospital encounter of 11/27/24    Adult Transthoracic Echo Complete W/ Cont if Necessary Per Protocol    Interpretation Summary    Left ventricular systolic function is normal. Calculated left ventricular EF = 68.5%    Left ventricular wall thickness is consistent with hypertrophy. Sigmoid-shaped ventricular septum is present.    Left ventricular diastolic function was normal.    There is a trivial pericardial effusion.    The study is technically difficult for diagnosis.    Pertinent Labs     Results from last 7 days   Lab Units 12/03/24  0305 12/02/24 0402 12/01/24 0420 11/30/24 2027 11/30/24 0423   WBC 10*3/mm3 6.53 6.79 9.00  --  11.21*   HEMOGLOBIN g/dL 9.0* 9.4* 8.3* 8.4* 7.6*   PLATELETS 10*3/mm3 299 245 268  --  207     Results from last 7 days   Lab Units 12/03/24  0305 12/02/24 0402 12/01/24 0420 11/30/24  0423   SODIUM mmol/L 137 139 135* 136   POTASSIUM mmol/L 4.6 3.8 4.1 3.8   CHLORIDE mmol/L 103 105 103 104   CO2 mmol/L 27.7 20.0* 23.0 21.9*   BUN mg/dL 9 10 12 14   CREATININE mg/dL 0.95 0.83 0.87 0.94   GLUCOSE mg/dL 94 118* 124* 107*   EGFR mL/min/1.73 64.6 75.9 71.8 65.4     Results from last 7 days   Lab Units 11/30/24 0423 11/29/24  0544   ALBUMIN g/dL 2.6* 2.9*   BILIRUBIN mg/dL 0.2 0.3   ALK PHOS U/L 63 77   AST (SGOT) U/L 26 30   ALT (SGPT) U/L 16 21     Results from last 7 days   Lab Units 12/03/24  0305 12/02/24 0402 12/01/24 0420 11/30/24 0423 11/29/24 2003 11/29/24  0544 11/28/24  0354   CALCIUM mg/dL 8.3* 8.4* 8.0* 7.4*  --  8.2* 8.5*   ALBUMIN g/dL  --   --   --  2.6*  --  2.9*  --    MAGNESIUM mg/dL  --   --   --  2.0  --  2.2 2.2   PHOSPHORUS mg/dL  --   --   --  2.8 3.6 1.6*  --       "  Results from last 7 days   Lab Units 11/27/24  2247 11/27/24  2047   HSTROP T ng/L 6 <6     Results from last 7 days   Lab Units 11/28/24  1221   SODIUM UR mmol/L <20   OSMOLALITY UR mOsm/kg 474         Invalid input(s): \"LDLCALC\"  Results from last 7 days   Lab Units 11/29/24  1651 11/29/24  1510 11/29/24  1455 11/29/24  1219   BLOODCX   --  No growth at 4 days No growth at 4 days  --    URINECX   --   --   --  >100,000 CFU/mL Escherichia coli*   MRSAPCR  No MRSA Detected  --   --   --          Test Results Pending at Discharge     Pending Results       None              Discharge Details        Discharge Medications        New Medications        Instructions Start Date   ferrous sulfate 325 (65 FE) MG tablet   325 mg, Oral, Every Other Day   Start Date: December 5, 2024     oxyCODONE 5 MG immediate release tablet  Commonly known as: ROXICODONE   5 mg, Oral, Every 4 Hours PRN      vancomycin 125 MG capsule  Commonly known as: VANCOCIN   125 mg, Oral, Every 6 Hours Scheduled             Changes to Medications        Instructions Start Date   carBAMazepine 200 MG tablet  Commonly known as: TEGretol  What changed:   how much to take  additional instructions   400 mg, Oral, 3 Times Daily             Continue These Medications        Instructions Start Date   acetaminophen 500 MG tablet  Commonly known as: TYLENOL   500 mg, Every 4 Hours PRN      atorvastatin 80 MG tablet  Commonly known as: LIPITOR   80 mg, Nightly      cyclobenzaprine 10 MG tablet  Commonly known as: FLEXERIL   10 mg, Oral, 2 Times Daily PRN      ezetimibe 10 MG tablet  Commonly known as: ZETIA   10 mg, Daily      hydrOXYzine pamoate 25 MG capsule  Commonly known as: VISTARIL   25 mg, 3 Times Daily PRN      levothyroxine 50 MCG tablet  Commonly known as: SYNTHROID, LEVOTHROID   50 mcg, Daily      ondansetron 4 MG tablet  Commonly known as: ZOFRAN   4 mg, Every 8 Hours PRN      pregabalin 50 MG capsule  Commonly known as: LYRICA   50 mg, 2 Times " Daily      traZODone 50 MG tablet  Commonly known as: DESYREL   50 mg, Nightly      vitamin D 1.25 MG (23521 UT) capsule capsule  Commonly known as: ERGOCALCIFEROL   50,000 Units, Weekly             Stop These Medications      ibuprofen 600 MG tablet  Commonly known as: ADVIL,MOTRIN     meloxicam 15 MG tablet  Commonly known as: MOBIC              No Known Allergies    Discharge Disposition:  Home or Self Care      Discharge Diet:  Diet Order   Procedures    Diet: Regular/House; Fluid Consistency: Thin (IDDSI 0)       Discharge Activity:       CODE STATUS:    Code Status and Medical Interventions: CPR (Attempt to Resuscitate); Full   Ordered at: 11/1955     Code Status (Patient has no pulse and is not breathing):    CPR (Attempt to Resuscitate)     Medical Interventions (Patient has pulse or is breathing):    Full       No future appointments.   Follow-up Information       Lisseth Rankin, APRN .    Specialty: Emergency Medicine  Contact information:  [address] [id number] [phone]               Marcelo Soto MD. Schedule an appointment as soon as possible for a visit in 1 week(s).    Specialty: Urology  Contact information:  [address]  [facility] [id number] [phone]                             Time Spent on Discharge:  45 minutes      May Haddad DO  [facility]  12/03/24  16:32 EST

## 2024-12-03 NOTE — PLAN OF CARE
Goal Outcome Evaluation:  Plan of Care Reviewed With: patient        Progress: improving  Vss. Awaiting mri. Pt reports small amount of diarrhea and 3 small drops of bleeding. Pt rested well overnight

## 2024-12-04 ENCOUNTER — READMISSION MANAGEMENT (OUTPATIENT)
Dept: CALL CENTER | Facility: HOSPITAL | Age: 69
End: 2024-12-04
Payer: MEDICARE

## 2024-12-04 LAB
BACTERIA SPEC AEROBE CULT: NORMAL
BACTERIA SPEC AEROBE CULT: NORMAL

## 2024-12-04 NOTE — CASE MANAGEMENT/SOCIAL WORK
Case Management Discharge Note      Final Note: Plan home with family to transport.         Selected Continued Care - Discharged on 12/3/2024 Admission date: 11/27/2024 - Discharge disposition: Home or Self Care      Destination    No services have been selected for the patient.                Durable Medical Equipment    No services have been selected for the patient.                Dialysis/Infusion    No services have been selected for the patient.                Home Medical Care    No services have been selected for the patient.                Therapy    No services have been selected for the patient.                Community Resources    No services have been selected for the patient.                Community & DME    No services have been selected for the patient.                         Final Discharge Disposition Code: 01 - home or self-care

## 2024-12-04 NOTE — OUTREACH NOTE
Prep Survey      Flowsheet Row Responses   [facility] facility patient discharged from? [facility]   Is LACE score < 7 ? No   Eligibility Readm Mgmt   Discharge diagnosis dyspnea, CYSTOSCOPY URETERAL CATHETER/STENT INSERTION   Does the patient have one of the following disease processes/diagnoses(primary or secondary)? Other   Does the patient have Home health ordered? No   Is there a DME ordered? No   Prep survey completed? Yes            Florence FALCON - Registered Nurse

## 2024-12-13 ENCOUNTER — READMISSION MANAGEMENT (OUTPATIENT)
Dept: CALL CENTER | Facility: HOSPITAL | Age: 69
End: 2024-12-13
Payer: MEDICARE

## 2024-12-13 NOTE — OUTREACH NOTE
Medical Week 2 Survey      Flowsheet Row Responses   [facility] patient discharged from? [facility]   Does the patient have one of the following disease processes/diagnoses(primary or secondary)? Other   Week 2 attempt successful? Yes   Call start time 1242   Discharge diagnosis dyspnea, CYSTOSCOPY URETERAL CATHETER/STENT INSERTION   Call end time 1244   Person spoke with today (if not patient) and relationship Patient   Meds reviewed with patient/caregiver? Yes   Does the patient have all medications ordered at discharge? Yes   Prescription comments No concerns or questions noted.   Is the patient taking all medications as directed (includes completed medication regime)? Yes   Does the patient have a primary care provider?  Yes   Comments regarding PCP Patient following oncology   Has home health visited the patient within 72 hours of discharge? N/A   Psychosocial issues? No   Did the patient receive a copy of their discharge instructions? Yes   Nursing interventions Reviewed instructions with patient   What is the patient's perception of their health status since discharge? Same   Is the patient/caregiver able to teach back signs and symptoms related to disease process for when to call PCP? Yes   Is the patient/caregiver able to teach back signs and symptoms related to disease process for when to call 911? Yes   Is the patient/caregiver able to teach back the hierarchy of who to call/visit for symptoms/problems? PCP, Specialist, Home health nurse, Urgent Care, ED, 911 Yes   Week 2 Call Completed? Yes   Graduated Yes   Wrap up additional comments Patient still in alot of pain however was advised from oncology this was to be expected. Otherwise no other concerns or questions noted.   Call end time 1244            Genna ZAMORANO - Registered Nurse

## (undated) DEVICE — SYR LUERLOK 20CC BX/50

## (undated) DEVICE — GLV SURG BIOGEL LTX PF 7

## (undated) DEVICE — LOU CYSTO: Brand: MEDLINE INDUSTRIES, INC.

## (undated) DEVICE — CATH URETRL FLXITP POLLACK STD 5F 70CM

## (undated) DEVICE — NITINOL WIRE WITH HYDROPHILIC TIP: Brand: SENSOR

## (undated) DEVICE — TIDISHIELD UROLOGY DRAIN BAGS FROSTY VINYL STERILE FITS SIEMENS UROSKOP ACCESS 20 PER CASE: Brand: TIDISHIELD

## (undated) DEVICE — 1LYRTR 16FR10ML100%SIL UMS SNP: Brand: MEDLINE INDUSTRIES, INC.

## (undated) DEVICE — GW ZIPWIRE STD/SHFT STR TPR/3CM .035IN 150CM